# Patient Record
Sex: MALE | Race: WHITE | NOT HISPANIC OR LATINO | Employment: FULL TIME | URBAN - METROPOLITAN AREA
[De-identification: names, ages, dates, MRNs, and addresses within clinical notes are randomized per-mention and may not be internally consistent; named-entity substitution may affect disease eponyms.]

---

## 2017-01-21 ENCOUNTER — TRANSCRIBE ORDERS (OUTPATIENT)
Dept: ADMINISTRATIVE | Facility: HOSPITAL | Age: 32
End: 2017-01-21

## 2017-01-21 ENCOUNTER — APPOINTMENT (OUTPATIENT)
Dept: LAB | Facility: HOSPITAL | Age: 32
End: 2017-01-21
Payer: COMMERCIAL

## 2017-01-21 DIAGNOSIS — Z00.00 ENCOUNTER FOR GENERAL ADULT MEDICAL EXAMINATION WITHOUT ABNORMAL FINDINGS: ICD-10-CM

## 2017-01-21 DIAGNOSIS — L30.9 DERMATITIS: ICD-10-CM

## 2017-01-21 LAB
ALBUMIN SERPL BCP-MCNC: 3.9 G/DL (ref 3.5–5)
ALP SERPL-CCNC: 48 U/L (ref 46–116)
ALT SERPL W P-5'-P-CCNC: 30 U/L (ref 12–78)
ANION GAP SERPL CALCULATED.3IONS-SCNC: 9 MMOL/L (ref 4–13)
AST SERPL W P-5'-P-CCNC: 24 U/L (ref 5–45)
BILIRUB SERPL-MCNC: 0.5 MG/DL (ref 0.2–1)
BUN SERPL-MCNC: 11 MG/DL (ref 5–25)
CALCIUM SERPL-MCNC: 8.6 MG/DL (ref 8.3–10.1)
CHLORIDE SERPL-SCNC: 104 MMOL/L (ref 100–108)
CHOLEST SERPL-MCNC: 135 MG/DL (ref 50–200)
CO2 SERPL-SCNC: 30 MMOL/L (ref 21–32)
CREAT SERPL-MCNC: 0.87 MG/DL (ref 0.6–1.3)
GFR SERPL CREATININE-BSD FRML MDRD: >60 ML/MIN/1.73SQ M
GLUCOSE SERPL-MCNC: 93 MG/DL (ref 65–140)
HDLC SERPL-MCNC: 50 MG/DL (ref 40–60)
LDLC SERPL CALC-MCNC: 71 MG/DL (ref 0–100)
POTASSIUM SERPL-SCNC: 3.9 MMOL/L (ref 3.5–5.3)
PROT SERPL-MCNC: 7.4 G/DL (ref 6.4–8.2)
SODIUM SERPL-SCNC: 143 MMOL/L (ref 136–145)
TRIGL SERPL-MCNC: 69 MG/DL

## 2017-01-21 PROCEDURE — 85732 THROMBOPLASTIN TIME PARTIAL: CPT

## 2017-01-21 PROCEDURE — 85670 THROMBIN TIME PLASMA: CPT

## 2017-01-21 PROCEDURE — 85613 RUSSELL VIPER VENOM DILUTED: CPT

## 2017-01-21 PROCEDURE — 36415 COLL VENOUS BLD VENIPUNCTURE: CPT

## 2017-01-21 PROCEDURE — 85705 THROMBOPLASTIN INHIBITION: CPT

## 2017-01-21 PROCEDURE — 86038 ANTINUCLEAR ANTIBODIES: CPT

## 2017-01-21 PROCEDURE — 80061 LIPID PANEL: CPT

## 2017-01-21 PROCEDURE — 80053 COMPREHEN METABOLIC PANEL: CPT

## 2017-01-23 ENCOUNTER — GENERIC CONVERSION - ENCOUNTER (OUTPATIENT)
Dept: OTHER | Facility: OTHER | Age: 32
End: 2017-01-23

## 2017-01-23 LAB — RYE IGE QN: NEGATIVE

## 2017-01-24 ENCOUNTER — GENERIC CONVERSION - ENCOUNTER (OUTPATIENT)
Dept: OTHER | Facility: OTHER | Age: 32
End: 2017-01-24

## 2017-01-24 LAB
APTT SCREEN TO CONFIRM RATIO: 0.78 RATIO (ref 0–1.4)
CONFIRM APTT/NORMAL: 41 SEC (ref 0–55)
LA PPP-IMP: NORMAL
SCREEN APTT: 39.8 SEC (ref 0–40.6)
SCREEN DRVVT: 34.9 SEC (ref 0–44)
THROMBIN TIME: 17.2 SEC (ref 0–20.9)

## 2017-02-13 ENCOUNTER — ALLSCRIPTS OFFICE VISIT (OUTPATIENT)
Dept: OTHER | Facility: OTHER | Age: 32
End: 2017-02-13

## 2017-05-26 ENCOUNTER — TRANSCRIBE ORDERS (OUTPATIENT)
Dept: ADMINISTRATIVE | Facility: HOSPITAL | Age: 32
End: 2017-05-26

## 2017-05-31 ENCOUNTER — TRANSCRIBE ORDERS (OUTPATIENT)
Dept: ADMINISTRATIVE | Facility: HOSPITAL | Age: 32
End: 2017-05-31

## 2017-06-02 ENCOUNTER — TRANSCRIBE ORDERS (OUTPATIENT)
Dept: ADMINISTRATIVE | Facility: HOSPITAL | Age: 32
End: 2017-06-02

## 2017-06-02 DIAGNOSIS — H90.11 CONDUCTIVE HEARING LOSS OF RIGHT EAR, UNSPECIFIED HEARING STATUS ON CONTRALATERAL SIDE: Primary | ICD-10-CM

## 2017-06-08 ENCOUNTER — HOSPITAL ENCOUNTER (OUTPATIENT)
Dept: CT IMAGING | Facility: HOSPITAL | Age: 32
Discharge: HOME | End: 2017-06-08
Payer: COMMERCIAL

## 2017-06-08 DIAGNOSIS — H90.11 CONDUCTIVE HEARING LOSS OF RIGHT EAR, UNSPECIFIED HEARING STATUS ON CONTRALATERAL SIDE: ICD-10-CM

## 2017-06-08 PROCEDURE — 70480 CT ORBIT/EAR/FOSSA W/O DYE: CPT

## 2017-07-29 ENCOUNTER — TRANSCRIBE ORDERS (OUTPATIENT)
Dept: ADMINISTRATIVE | Facility: HOSPITAL | Age: 32
End: 2017-07-29

## 2017-07-29 ENCOUNTER — APPOINTMENT (OUTPATIENT)
Dept: LAB | Facility: HOSPITAL | Age: 32
End: 2017-07-29
Payer: COMMERCIAL

## 2017-07-29 DIAGNOSIS — Z00.8 HEALTH EXAMINATION IN POPULATION SURVEY: Primary | ICD-10-CM

## 2017-07-29 DIAGNOSIS — Z00.8 HEALTH EXAMINATION IN POPULATION SURVEY: ICD-10-CM

## 2017-07-29 LAB
CHOLEST SERPL-MCNC: 140 MG/DL (ref 50–200)
EST. AVERAGE GLUCOSE BLD GHB EST-MCNC: 120 MG/DL
HBA1C MFR BLD: 5.8 % (ref 4.2–6.3)
HDLC SERPL-MCNC: 48 MG/DL (ref 40–60)
LDLC SERPL CALC-MCNC: 80 MG/DL (ref 0–100)
TRIGL SERPL-MCNC: 58 MG/DL

## 2017-07-29 PROCEDURE — 80061 LIPID PANEL: CPT

## 2017-07-29 PROCEDURE — 36415 COLL VENOUS BLD VENIPUNCTURE: CPT

## 2017-07-29 PROCEDURE — 83036 HEMOGLOBIN GLYCOSYLATED A1C: CPT

## 2017-08-15 ENCOUNTER — ALLSCRIPTS OFFICE VISIT (OUTPATIENT)
Dept: OTHER | Facility: OTHER | Age: 32
End: 2017-08-15

## 2017-08-15 DIAGNOSIS — H91.91 HEARING LOSS OF RIGHT EAR: ICD-10-CM

## 2018-01-10 NOTE — RESULT NOTES
Verified Results  (1) LUPUS ANTICOAGULANT PROFILE 21Jan2017 07:39AM Mary Jo Mcclure Order Number: [id number]     Test Name Result Flag Reference   PTT LUPUS ANTICOAGULANT 39 8 sec  0 0 - 40 6   **Effective February 20, 2017 PTT-LA reference**    interval will be changing to: 0 0 - 43 6   DILUTE LUDIN VIPER VENOM TIME 34 9 sec  0 0 - 44 0   DILUTE PROTHROMBIN TIME(DPT) 41 0 sec  0 0 - 55 0   THROMBIN TIME (DRVW) 17 2 sec  0 0 - 20 9   DPT CONFIRM RATIO 0 78 Ratio  0 00 - 1 40   LUPUS REFLEX INTERPRETATION Comment:     No lupus anticoagulant was detected      Performed at:  [address]  [id number]  : Yaw Brandt MD, Phone:  [phone]       Discussion/Summary   inform labs wnl

## 2018-01-12 ENCOUNTER — ALLSCRIPTS OFFICE VISIT (OUTPATIENT)
Dept: OTHER | Facility: OTHER | Age: 33
End: 2018-01-12

## 2018-01-12 VITALS
SYSTOLIC BLOOD PRESSURE: 115 MMHG | OXYGEN SATURATION: 98 % | HEIGHT: 69 IN | RESPIRATION RATE: 16 BRPM | DIASTOLIC BLOOD PRESSURE: 82 MMHG | BODY MASS INDEX: 26.81 KG/M2 | HEART RATE: 58 BPM | WEIGHT: 181 LBS

## 2018-01-13 NOTE — PROGRESS NOTES
Assessment   1  Acute maxillary sinusitis, recurrence not specified (461 0) (J01 00)    Plan   Acute maxillary sinusitis, recurrence not specified    · Amoxicillin-Pot Clavulanate 875-125 MG Oral Tablet; TAKE 1 TABLET EVERY 12    HOURS DAILY    Discussion/Summary      Patient is to start antibiotics  He is to use saline nasal spray and a Mucinex type product to help loosen the mucus  He is to follow up if he is not improving  The patient was counseled regarding instructions for management,-- risk factor reductions,-- impressions,-- risks and benefits of treatment options,-- importance of compliance with treatment  Possible side effects of new medications were reviewed with the patient/guardian today  The treatment plan was reviewed with the patient/guardian  The patient/guardian understands and agrees with the treatment plan       Self Referrals: No      Chief Complaint   Patient is here for evaluation of possible sinus infection  Symptoms are: headache, pain and pressure around eyes  Symptoms started one weeks ago  Patient tried OTC but no real improvement  History of Present Illness   HPI: 60-year-old male who comes in today complaining of facial pain and headaches  He became congested had a sore throat and had a cough that started last week  The congestion has continued as well as the cough  He is now noticing pain over his left maxillary area  Sinusitis (Brief): The patient is being seen for an initial evaluation of sinusitis  The sinusitis involves the maxillary sinuses  The sinusitis is classified as acute  facial pain facial pressure headache no retro-orbital pain no dental pain nasal congestion no clear rhinorrhea purulent rhinorrhea postnasal drainage      Associated symptoms:  sore throat-- and-- cough, but-- no fever,-- no chills,-- no nausea,-- no ear fullness,-- no ear pain,-- no ear pressure,-- no diplopia,-- no periorbital redness,-- no periorbital swelling-- and-- no neck stiffness  Current treatment includes non-prescription analgesics  By report, there is good compliance with treatment, good tolerance of treatment and poor symptom control  Review of Systems        Constitutional: as noted in HPI       ENT: as noted in HPI  Respiratory: as noted in HPI  ROS reviewed  Active Problems   1  Hearing loss on right (389 9) (H91 91)   2  Rosacea, acne (695 3) (L71 9)    Past Medical History   1  History of dermatitis (V13 3) (Z87 2)  Active Problems And Past Medical History Reviewed: The active problems and past medical history were reviewed and updated today  Family History   Mother    1  Denied: Family history of mental disorder   2  Denied: Family history of substance abuse  Father    3  Denied: Family history of mental disorder   4  Denied: Family history of substance abuse  Family History Reviewed: The family history was reviewed and updated today  Social History    · Always uses seat belt   · Daily caffeinated coffee consumption   · Employed   · Exercises regularly   · Lives with spouse   ·    · Never a smoker   · Seeing a dentist   · Social alcohol use (Z78 9)  The social history was reviewed and is unchanged  Surgical History   1  History of Cholesteatoma Surgery  Surgical History Reviewed: The surgical history was reviewed and updated today  Current Meds      The medication list was reviewed and updated today  Allergies   1  No Known Drug Allergies    Vitals    Recorded: 12Jan2018 03:49PM   Temperature 98 3 F   Heart Rate 62   Systolic 915   Diastolic 92   Height 5 ft 8 5 in   Weight 169 lb 9 oz   BMI Calculated 25 41   BSA Calculated 1 92   O2 Saturation 98     Physical Exam        Constitutional      General appearance: No acute distress, well appearing and well nourished  Ears, Nose, Mouth, and Throat      External inspection of ears and nose: Abnormal  -- Maxillary sinuses tender        Otoscopic examination: Tympanic membrance translucent with normal light reflex  Canals patent without erythema  Nasal mucosa, septum, and turbinates: Abnormal  -- Mucosa is edematous and erythematous  Oropharynx: Abnormal  -- Thick postnasal drainage noted  Pulmonary      Respiratory effort: No increased work of breathing or signs of respiratory distress  Auscultation of lungs: Clear to auscultation, equal breath sounds bilaterally, no wheezes, no rales, no rhonci  Cardiovascular      Auscultation of heart: Normal rate and rhythm, normal S1 and S2, without murmurs            Signatures    Electronically signed by : BERT Mckay; Jan 12 2018  4:15PM EST                       (Author)     Electronically signed by : MAGGY Andrew ; Jan 12 2018  4:44PM EST

## 2018-01-13 NOTE — RESULT NOTES
Verified Results  (1) SHARONDA SCREEN W/REFLEX TO TITER/PATTERN [id number] 07:39AM Korey Sage Order Number: [id number]     Test Name Result Flag Reference   SHARONDA SCREEN   Negative  Negative       Discussion/Summary   inform neg , wnl

## 2018-01-15 VITALS
HEIGHT: 69 IN | HEART RATE: 52 BPM | OXYGEN SATURATION: 98 % | WEIGHT: 172.5 LBS | BODY MASS INDEX: 25.55 KG/M2 | DIASTOLIC BLOOD PRESSURE: 80 MMHG | SYSTOLIC BLOOD PRESSURE: 112 MMHG

## 2018-01-18 NOTE — RESULT NOTES
Verified Results  (1) LIPID PANEL, FASTING 21Jan2017 07:39AM Mary Jo Mcclure Order Number: [id number]     Test Name Result Flag Reference   CHOLESTEROL 135 mg/dL     HDL,DIRECT 50 mg/dL  40-60   Specimen collection should occur prior to Metamizole administration due to the potential for falsely depressed results  LDL CHOLESTEROL CALCULATED 71 mg/dL  0-100   - Patient Instructions: This is a fasting blood test  Water,black tea or black  coffee only after 9:00pm the night before test   Drink 2 glasses of water the morning of test     - Patient Instructions: This is a fasting blood test  Water,black tea or black  coffee only after 9:00pm the night before test Drink 2 glasses of water the morning of test   Triglyceride:         Normal              <150 mg/dl       Borderline High    150-199 mg/dl       High               200-499 mg/dl       Very High          >499 mg/dl  Cholesterol:         Desirable        <200 mg/dl      Borderline High  200-239 mg/dl      High             >239 mg/dl  HDL Cholesterol:        High    >59 mg/dL      Low     <41 mg/dL  LDL CALCULATED:    This screening LDL is a calculated result  It does not have the accuracy of the Direct Measured LDL in the monitoring of patients with hyperlipidemia and/or statin therapy  Direct Measure LDL (XSI027) must be ordered separately in these patients  TRIGLYCERIDES 69 mg/dL  <=150   Specimen collection should occur prior to N-Acetylcysteine or Metamizole administration due to the potential for falsely depressed results         Discussion/Summary   infrom labs wnl    chol great     glucose normal

## 2018-01-23 VITALS
HEIGHT: 69 IN | DIASTOLIC BLOOD PRESSURE: 92 MMHG | BODY MASS INDEX: 25.11 KG/M2 | SYSTOLIC BLOOD PRESSURE: 144 MMHG | WEIGHT: 169.56 LBS | TEMPERATURE: 98.3 F | HEART RATE: 62 BPM | OXYGEN SATURATION: 98 %

## 2018-04-10 ENCOUNTER — OFFICE VISIT (OUTPATIENT)
Dept: UROLOGY | Facility: CLINIC | Age: 33
End: 2018-04-10
Payer: COMMERCIAL

## 2018-04-10 VITALS
SYSTOLIC BLOOD PRESSURE: 136 MMHG | HEIGHT: 69 IN | HEART RATE: 74 BPM | DIASTOLIC BLOOD PRESSURE: 80 MMHG | WEIGHT: 170 LBS | BODY MASS INDEX: 25.18 KG/M2

## 2018-04-10 DIAGNOSIS — Z30.2 ENCOUNTER FOR STERILIZATION: Primary | ICD-10-CM

## 2018-04-10 PROCEDURE — 99244 OFF/OP CNSLTJ NEW/EST MOD 40: CPT | Performed by: UROLOGY

## 2018-04-10 RX ORDER — DIAZEPAM 10 MG/1
10 TABLET ORAL ONCE
Qty: 1 TABLET | Refills: 0 | Status: SHIPPED | OUTPATIENT
Start: 2018-04-10 | End: 2018-08-08 | Stop reason: CLARIF

## 2018-04-10 NOTE — PATIENT INSTRUCTIONS
Vasectomy   WHAT YOU NEED TO KNOW:   A vasectomy is a procedure to make you sterile  It is a permanent form of birth control  The vas deferens (sperm tubes) are cut so that the semen does not contain sperm  HOW TO PREPARE:   Before your procedure:   · Write down the correct date, time, and location of your procedure  · Arrange a ride home  Ask a family member or friend to drive you home after your surgery or procedure  Do not drive yourself home  · Ask your caregiver if you need to stop using aspirin or any other prescribed or over-the-counter medicine before your procedure or surgery  · Bring your medicine bottles or a list of your medicines when you see your caregiver  Tell your caregiver if you are allergic to any medicine  Tell your caregiver if you use any herbs, food supplements, or over-the-counter medicine  The night before your procedure:  Ask caregivers about directions for eating and drinking  The day of your procedure:   · Ask your caregiver before taking any medicine on the day of your procedure  These medicines include insulin, diabetic pills, high blood pressure pills, or heart pills  Bring a list of all the medicines you take, or your pill bottles, with you to the hospital     · You or a close family member will be asked to sign a legal document called a consent form  It gives caregivers permission to do the procedure or surgery  It also explains the problems that may happen, and your choices  Make sure all your questions are answered before you sign this form  · Caregivers may insert an intravenous tube (IV) into your vein  A vein in the arm is usually chosen  Through the IV tube, you may be given liquids and medicine  · An anesthesiologist will talk to you before your surgery  You may need medicine to keep you asleep or numb an area of your body during surgery   Tell caregivers if you or anyone in your family has had a problem with anesthesia in the past     · Bring an athletic supporter to wear after your procedure  WHAT WILL HAPPEN:   What will happen: An incision will be made on one side of your scrotum or down the middle  One of your sperm tubes will be pulled through the incision  Your surgeon will cut the sperm tube and remove a small portion of it  He may then close one or both ends with stitches or a heat treatment  He also may sew a piece of body tissue between the cut ends of your tubes  Your surgeon will then do the same procedure to your other sperm tube  Your incisions may be closed with stitches, tissue glue, or left open to heal  Germ-fighting medicine may be put on your scrotum, and the area will be covered with a bandage  After your procedure: You will be taken to a room to rest until you are fully awake  Healthcare providers will monitor you closely for any problems  Healthcare providers may apply ice and an athletic supporter to decrease swelling and bruising  Do not get out of bed until your healthcare provider says it is okay  When your healthcare provider sees that you are okay, you will be able to go home or be taken to your hospital room  CONTACT YOUR HEALTHCARE PROVIDER IF:   · You cannot make it to your procedure  · You have a fever  · You get a cold or the flu  · You have questions or concerns about your procedure  RISKS:   You may bleed more than expected  Your scrotum may be bruised or inflamed  You may get a wound, urinary tract, or epididymal infection  The epididymis is a long, curled tube on the back of your scrotum  You may feel pain when you have an erection  Granulomas may form if sperm leaks out of your cut sperm tube  Granulomas are a lump that forms under your skin  You may not become sterile if one or both of your cut sperm tubes grow back together  CARE AGREEMENT:   You have the right to help plan your care  Learn about your health condition and how it may be treated   Discuss treatment options with your caregivers to decide what care you want to receive  You always have the right to refuse treatment  © 2017 [employer] Information is for End User's use only and may not be sold, redistributed or otherwise used for commercial purposes  All illustrations and images included in CareNotes® are the copyrighted property of [employer]  or [employer]  The above information is an  only  It is not intended as medical advice for individual conditions or treatments  Talk to your doctor, nurse or pharmacist before following any medical regimen to see if it is safe and effective for you

## 2018-04-10 NOTE — PROGRESS NOTES
Diagnoses and all orders for this visit:    Encounter for sterilization  -     diazepam (VALIUM) 10 mg tablet; Take 1 tablet (10 mg total) by mouth once for 1 dose Take 1 hour prior to scheduled procedure  -     Vasectomy; Future            Assessment and plan:       We had a long discussion regarding the options for birth control  I told the patient that vasectomy is considered to be a permanent surgical sterilization procedure  We spoke about other options including the possibility of vasectomy reversal at a later time  He understands that vasectomy confers no immunity to STDs  I also told him that according to our present knowledge, there is no causal relationship between vasectomy and subsequent development of prostate cancer  We spoke about the potential complications  The most common one in the short term is scrotal hematoma and infection, which rarely requires re-operation  Additionally, he can react to the anesthetic, develop scrotal swelling, have pain or skin bruising  We spoke about post procedure care to try to minimize this complication  I also asked him to refrain from aspirin products and alcohol prior to the procedure  The long-term complications include but are not limited to vasectomy failure by recanalization, chronic epididymal discomfort, pain, and testicular atrophy, among other possibilities  I described to him how this procedure is normally performed in an office setting and he understands that if anesthesia is desired, this can be performed for him in an outpatient operative setting  Finally, he understands that following vasectomy, hell need to use other means of birth control until hes had two sequential semen analyses that demonstrate the absence of sperm  He understands it will be his responsibility to submit these semen specimens and call our office for the results   I told him again that recanalization is a small but real possibility, and if he is ever concerned about it he can submit another semen specimen for analysis  After discussing the risks, benefits, possible complications and alternatives, informed consents were obtained  Diazepam and Percocet and antibiotics were prescribed  He will return in the near future for the procedure  Chief Complaint     Desire for vasectomy      History of Present Illness     Efrain Lora is a 28 y o  male referred for evaluation of vasectomy  He has 2 biological children  He states that he and his partner have come to the mutual decision they do not desire any additional children  He has no prior past medical, past surgical, or past urologic history    Detailed Urologic History     - please refer to HPI    Review of Systems     Review of Systems   Constitutional: Negative for activity change and fatigue  HENT: Negative for congestion  Eyes: Negative for visual disturbance  Respiratory: Negative for shortness of breath and wheezing  Cardiovascular: Negative for chest pain and leg swelling  Gastrointestinal: Negative for abdominal pain  Endocrine: Negative for polyuria  Genitourinary: Negative for dysuria, flank pain, hematuria and urgency  Musculoskeletal: Negative for back pain  Allergic/Immunologic: Negative for immunocompromised state  Neurological: Negative for dizziness and numbness  Psychiatric/Behavioral: Negative for dysphoric mood  All other systems reviewed and are negative  Allergies     No Known Allergies    Physical Exam     Physical Exam   Constitutional: He is oriented to person, place, and time  He appears well-developed and well-nourished  No distress  HENT:   Head: Normocephalic and atraumatic  Eyes: EOM are normal    Neck: Normal range of motion  Cardiovascular:   Negative lower extremity edema   Pulmonary/Chest: Effort normal and breath sounds normal    Abdominal: Soft  Genitourinary:   Genitourinary Comments: Vas deferens are palpable bilaterally  Musculoskeletal: Normal range of motion  Neurological: He is alert and oriented to person, place, and time  Skin: Skin is warm  Psychiatric: He has a normal mood and affect  His behavior is normal          Vital Signs  Vitals:    04/10/18 0757   BP: 136/80   BP Location: Left arm   Patient Position: Sitting   Cuff Size: Adult   Pulse: 74   Weight: 77 1 kg (170 lb)   Height: 5' 9" (1 753 m)         Current Medications       Current Outpatient Prescriptions:     diazepam (VALIUM) 10 mg tablet, Take 1 tablet (10 mg total) by mouth once for 1 dose Take 1 hour prior to scheduled procedure, Disp: 1 tablet, Rfl: 0      Active Problems     Patient Active Problem List   Diagnosis    Encounter for sterilization         Past Medical History     History reviewed  No pertinent past medical history  Surgical History     History reviewed  No pertinent surgical history  Family History     History reviewed  No pertinent family history  Social History     Social History     History   Smoking Status    Never Smoker   Smokeless Tobacco    Never Used       Portions of the record may have been created with voice recognition software   Occasional wrong word or "sound a like" substitutions may have occurred due to the inherent limitations of voice recognition software   Read the chart carefully and recognize, using context, where substitutions have occurred

## 2018-07-19 ENCOUNTER — PROCEDURE VISIT (OUTPATIENT)
Dept: UROLOGY | Facility: CLINIC | Age: 33
End: 2018-07-19
Payer: COMMERCIAL

## 2018-07-19 VITALS
BODY MASS INDEX: 24.88 KG/M2 | WEIGHT: 168 LBS | SYSTOLIC BLOOD PRESSURE: 128 MMHG | DIASTOLIC BLOOD PRESSURE: 70 MMHG | HEIGHT: 69 IN

## 2018-07-19 DIAGNOSIS — Z30.2 ENCOUNTER FOR VASECTOMY: Primary | ICD-10-CM

## 2018-07-19 PROCEDURE — 55250 REMOVAL OF SPERM DUCT(S): CPT | Performed by: UROLOGY

## 2018-07-19 PROCEDURE — 88302 TISSUE EXAM BY PATHOLOGIST: CPT | Performed by: PATHOLOGY

## 2018-07-25 ENCOUNTER — TELEPHONE (OUTPATIENT)
Dept: UROLOGY | Facility: CLINIC | Age: 33
End: 2018-07-25

## 2018-07-25 ENCOUNTER — OFFICE VISIT (OUTPATIENT)
Dept: UROLOGY | Facility: CLINIC | Age: 33
End: 2018-07-25
Payer: COMMERCIAL

## 2018-07-25 VITALS
TEMPERATURE: 99.5 F | SYSTOLIC BLOOD PRESSURE: 128 MMHG | WEIGHT: 168 LBS | BODY MASS INDEX: 24.88 KG/M2 | DIASTOLIC BLOOD PRESSURE: 80 MMHG | HEART RATE: 74 BPM | HEIGHT: 69 IN

## 2018-07-25 DIAGNOSIS — Z98.52 STATUS POST VASECTOMY: Primary | ICD-10-CM

## 2018-07-25 DIAGNOSIS — Z30.2 ENCOUNTER FOR VASECTOMY: ICD-10-CM

## 2018-07-25 LAB
SL AMB  POCT GLUCOSE, UA: NORMAL
SL AMB LEUKOCYTE ESTERASE,UA: NORMAL
SL AMB POCT BILIRUBIN,UA: NORMAL
SL AMB POCT BLOOD,UA: NORMAL
SL AMB POCT CLARITY,UA: CLEAR
SL AMB POCT COLOR,UA: YELLOW
SL AMB POCT KETONES,UA: NORMAL
SL AMB POCT NITRITE,UA: NORMAL
SL AMB POCT PH,UA: 5
SL AMB POCT SPECIFIC GRAVITY,UA: 1.01
SL AMB POCT URINE PROTEIN: NORMAL
SL AMB POCT UROBILINOGEN: NORMAL

## 2018-07-25 PROCEDURE — 99024 POSTOP FOLLOW-UP VISIT: CPT | Performed by: PHYSICIAN ASSISTANT

## 2018-07-25 PROCEDURE — 81002 URINALYSIS NONAUTO W/O SCOPE: CPT | Performed by: PHYSICIAN ASSISTANT

## 2018-07-25 RX ORDER — CEPHALEXIN 500 MG/1
500 CAPSULE ORAL 3 TIMES DAILY
Qty: 12 CAPSULE | Refills: 0 | Status: SHIPPED | OUTPATIENT
Start: 2018-07-25 | End: 2018-07-29

## 2018-07-25 NOTE — TELEPHONE ENCOUNTER
La patient, managed by Dr Jeanine Miller, s/p vasectomy last Thursday 7/19/18  Received voicemail from spouse, patient started to have fever last night 102 degrees,  Temperature today is 99 and he is using Ibuprofen to treat the fever  He has no redness, no swelling, no discharged at incision sites  He has mild testicular discomfort which has been  since procedure, no change or worsening  States patient may have a virus  Calling to update  Attempted to reach both patient and spouse and got voicemail  Left messages on both patient cell phone and spouse cell phone to call office

## 2018-07-25 NOTE — TELEPHONE ENCOUNTER
Spoke to Dr Lisa Simon, recommend patient be seen for same day wound check today with Cathy in [place]  I called and spoke to spouse offered to schedule at 11:30 am with Bogdan Ahumada in [place], patient went to work  She will check if he can get there for that time and call me back

## 2018-07-25 NOTE — TELEPHONE ENCOUNTER
Spoke to spouse, patient is unable to get to office for 11:30 ,  Per Mukesh mao to schedule today at 2:45 pm in [facility] for wound check  Called and spoke to spouse and patient added to schedule

## 2018-07-26 NOTE — PROGRESS NOTES
7/25/2018  Cooper Luu  1985  [id number]      Assessment/Plan  S/p vasectomy 7/19/18    1  Status post vasectomy  POCT urine dip    Semen analysis, post-vasectomy    Semen analysis, post-vasectomy    cephalexin (KEFLEX) 500 mg capsule   2  Encounter for vasectomy           Discussion  Cooper Luu is a 28 y o  male being managed by Dr Benigno Chambers  The patient is doing well with no complaints  He was provided verbal and written instructions regarding semen analysis testing  He was instructed to use contraception until sterility confirmed with 2 semen analysis  He will call to review results  He will follow up on an as needed basis  All questions were answered  Doubtful that his temperature is associated with the healing process from his vasectomy, in likely secondary to viral old he is experiencing  Very very small drainage from left scrotal incision  We will be cautious and cover with a 5 day course of Keflex  Patient will follow up with primary care provider should his congestion and sore throat worsen  History of Present Illness  28 y o  male s/p vasectomy 7/19/18 with Dr Benigno Chambers, presents today for follow up  Patient admits to dull bilateral testicle discomfort which is improving a daily basis  He denies any residual ecchymosis, edema, or suprapubic pressure  Patient does state that his son was recently sick and patient has been has been experiencing high temperatures up to 101-102  Patient does feel that this is viral in etiology as he has nasal congestion as well as a sore throat  He denies any productive cough  His temperatures have been under 100 over the past 24 hr   He denies any dysuria, gross hematuria, or drainage from incisions      Vitals  Vitals:    07/25/18 1445   BP: 128/80   BP Location: Left arm   Patient Position: Sitting   Cuff Size: Adult   Pulse: 74   Temp: 99 5 °F (37 5 °C)   Weight: 76 2 kg (168 lb)   Height: 5' 9" (1 753 m)       Current Medications  Current Outpatient Prescriptions   Medication Sig Dispense Refill    cephalexin (KEFLEX) 500 mg capsule Take 1 capsule (500 mg total) by mouth 3 (three) times a day for 4 days 12 capsule 0    diazepam (VALIUM) 10 mg tablet Take 1 tablet (10 mg total) by mouth once for 1 dose Take 1 hour prior to scheduled procedure 1 tablet 0     No current facility-administered medications for this visit  Physical Exam  Gu: scrotum midline/bilateral incisions clean and intact  Very small serous drainage from left incision  No fluctuance, crepitus, ecchymosis, or edema

## 2018-07-28 ENCOUNTER — APPOINTMENT (OUTPATIENT)
Dept: LAB | Facility: HOSPITAL | Age: 33
End: 2018-07-28

## 2018-07-28 ENCOUNTER — TRANSCRIBE ORDERS (OUTPATIENT)
Dept: ADMINISTRATIVE | Facility: HOSPITAL | Age: 33
End: 2018-07-28

## 2018-07-28 DIAGNOSIS — Z00.8 ENCOUNTER FOR OTHER GENERAL EXAMINATION: Primary | ICD-10-CM

## 2018-07-28 DIAGNOSIS — Z00.8 ENCOUNTER FOR OTHER GENERAL EXAMINATION: ICD-10-CM

## 2018-07-28 LAB
CHOLEST SERPL-MCNC: 113 MG/DL (ref 50–200)
EST. AVERAGE GLUCOSE BLD GHB EST-MCNC: 117 MG/DL
HBA1C MFR BLD: 5.7 % (ref 4.2–6.3)
HDLC SERPL-MCNC: 38 MG/DL (ref 40–60)
LDLC SERPL CALC-MCNC: 61 MG/DL (ref 0–100)
NONHDLC SERPL-MCNC: 75 MG/DL
TRIGL SERPL-MCNC: 70 MG/DL

## 2018-07-28 PROCEDURE — 80061 LIPID PANEL: CPT

## 2018-07-28 PROCEDURE — 83036 HEMOGLOBIN GLYCOSYLATED A1C: CPT

## 2018-07-28 PROCEDURE — 36415 COLL VENOUS BLD VENIPUNCTURE: CPT

## 2018-08-08 ENCOUNTER — OFFICE VISIT (OUTPATIENT)
Dept: INTERNAL MEDICINE CLINIC | Facility: CLINIC | Age: 33
End: 2018-08-08
Payer: COMMERCIAL

## 2018-08-08 ENCOUNTER — APPOINTMENT (OUTPATIENT)
Dept: LAB | Facility: CLINIC | Age: 33
End: 2018-08-08
Payer: COMMERCIAL

## 2018-08-08 ENCOUNTER — TELEPHONE (OUTPATIENT)
Dept: UROLOGY | Facility: CLINIC | Age: 33
End: 2018-08-08

## 2018-08-08 VITALS
HEIGHT: 69 IN | HEART RATE: 69 BPM | SYSTOLIC BLOOD PRESSURE: 122 MMHG | DIASTOLIC BLOOD PRESSURE: 80 MMHG | OXYGEN SATURATION: 98 % | TEMPERATURE: 99.3 F | WEIGHT: 167 LBS | BODY MASS INDEX: 24.73 KG/M2

## 2018-08-08 DIAGNOSIS — R10.11 RIGHT UPPER QUADRANT ABDOMINAL PAIN: ICD-10-CM

## 2018-08-08 DIAGNOSIS — R50.9 FEVER OF UNKNOWN ORIGIN (FUO): ICD-10-CM

## 2018-08-08 DIAGNOSIS — Z98.52 S/P VASECTOMY: ICD-10-CM

## 2018-08-08 DIAGNOSIS — R50.9 FEVER OF UNKNOWN ORIGIN (FUO): Primary | ICD-10-CM

## 2018-08-08 PROBLEM — L71.9 ROSACEA, ACNE: Status: ACTIVE | Noted: 2017-02-13

## 2018-08-08 LAB
ALBUMIN SERPL BCP-MCNC: 4.1 G/DL (ref 3.5–5)
ALP SERPL-CCNC: 52 U/L (ref 46–116)
ALT SERPL W P-5'-P-CCNC: 34 U/L (ref 12–78)
ANION GAP SERPL CALCULATED.3IONS-SCNC: 6 MMOL/L (ref 4–13)
AST SERPL W P-5'-P-CCNC: 24 U/L (ref 5–45)
BASOPHILS # BLD AUTO: 0.05 THOUSANDS/ΜL (ref 0–0.1)
BASOPHILS NFR BLD AUTO: 0 % (ref 0–1)
BILIRUB SERPL-MCNC: 0.54 MG/DL (ref 0.2–1)
BILIRUB UR QL STRIP: NEGATIVE
BUN SERPL-MCNC: 13 MG/DL (ref 5–25)
CALCIUM SERPL-MCNC: 9.1 MG/DL (ref 8.3–10.1)
CHLORIDE SERPL-SCNC: 101 MMOL/L (ref 100–108)
CLARITY UR: CLEAR
CO2 SERPL-SCNC: 32 MMOL/L (ref 21–32)
COLOR UR: NORMAL
CREAT SERPL-MCNC: 0.98 MG/DL (ref 0.6–1.3)
EOSINOPHIL # BLD AUTO: 0.02 THOUSAND/ΜL (ref 0–0.61)
EOSINOPHIL NFR BLD AUTO: 0 % (ref 0–6)
ERYTHROCYTE [DISTWIDTH] IN BLOOD BY AUTOMATED COUNT: 12.1 % (ref 11.6–15.1)
GFR SERPL CREATININE-BSD FRML MDRD: 102 ML/MIN/1.73SQ M
GLUCOSE SERPL-MCNC: 100 MG/DL (ref 65–140)
GLUCOSE UR STRIP-MCNC: NEGATIVE MG/DL
HCT VFR BLD AUTO: 46.6 % (ref 36.5–49.3)
HGB BLD-MCNC: 15.2 G/DL (ref 12–17)
HGB UR QL STRIP.AUTO: NEGATIVE
IMM GRANULOCYTES # BLD AUTO: 0.05 THOUSAND/UL (ref 0–0.2)
IMM GRANULOCYTES NFR BLD AUTO: 0 % (ref 0–2)
KETONES UR STRIP-MCNC: NEGATIVE MG/DL
LEUKOCYTE ESTERASE UR QL STRIP: NEGATIVE
LYMPHOCYTES # BLD AUTO: 1.73 THOUSANDS/ΜL (ref 0.6–4.47)
LYMPHOCYTES NFR BLD AUTO: 13 % (ref 14–44)
MCH RBC QN AUTO: 29.2 PG (ref 26.8–34.3)
MCHC RBC AUTO-ENTMCNC: 32.6 G/DL (ref 31.4–37.4)
MCV RBC AUTO: 90 FL (ref 82–98)
MONOCYTES # BLD AUTO: 1.06 THOUSAND/ΜL (ref 0.17–1.22)
MONOCYTES NFR BLD AUTO: 8 % (ref 4–12)
NEUTROPHILS # BLD AUTO: 10.4 THOUSANDS/ΜL (ref 1.85–7.62)
NEUTS SEG NFR BLD AUTO: 79 % (ref 43–75)
NITRITE UR QL STRIP: NEGATIVE
NRBC BLD AUTO-RTO: 0 /100 WBCS
PH UR STRIP.AUTO: 7 [PH] (ref 4.5–8)
PLATELET # BLD AUTO: 228 THOUSANDS/UL (ref 149–390)
PMV BLD AUTO: 9.7 FL (ref 8.9–12.7)
POTASSIUM SERPL-SCNC: 4.2 MMOL/L (ref 3.5–5.3)
PROT SERPL-MCNC: 8.2 G/DL (ref 6.4–8.2)
PROT UR STRIP-MCNC: NEGATIVE MG/DL
RBC # BLD AUTO: 5.2 MILLION/UL (ref 3.88–5.62)
SODIUM SERPL-SCNC: 139 MMOL/L (ref 136–145)
SP GR UR STRIP.AUTO: 1.02 (ref 1–1.03)
TSH SERPL DL<=0.05 MIU/L-ACNC: 1.98 UIU/ML (ref 0.36–3.74)
URATE SERPL-MCNC: 3.6 MG/DL (ref 4.2–8)
UROBILINOGEN UR QL STRIP.AUTO: 1 E.U./DL
WBC # BLD AUTO: 13.31 THOUSAND/UL (ref 4.31–10.16)

## 2018-08-08 PROCEDURE — 3008F BODY MASS INDEX DOCD: CPT | Performed by: INTERNAL MEDICINE

## 2018-08-08 PROCEDURE — 81003 URINALYSIS AUTO W/O SCOPE: CPT

## 2018-08-08 PROCEDURE — 86803 HEPATITIS C AB TEST: CPT

## 2018-08-08 PROCEDURE — 36415 COLL VENOUS BLD VENIPUNCTURE: CPT

## 2018-08-08 PROCEDURE — 86705 HEP B CORE ANTIBODY IGM: CPT

## 2018-08-08 PROCEDURE — 80053 COMPREHEN METABOLIC PANEL: CPT

## 2018-08-08 PROCEDURE — 1036F TOBACCO NON-USER: CPT | Performed by: INTERNAL MEDICINE

## 2018-08-08 PROCEDURE — 84443 ASSAY THYROID STIM HORMONE: CPT

## 2018-08-08 PROCEDURE — 86753 PROTOZOA ANTIBODY NOS: CPT

## 2018-08-08 PROCEDURE — 84550 ASSAY OF BLOOD/URIC ACID: CPT

## 2018-08-08 PROCEDURE — 86664 EPSTEIN-BARR NUCLEAR ANTIGEN: CPT

## 2018-08-08 PROCEDURE — 99214 OFFICE O/P EST MOD 30 MIN: CPT | Performed by: INTERNAL MEDICINE

## 2018-08-08 PROCEDURE — 87340 HEPATITIS B SURFACE AG IA: CPT

## 2018-08-08 PROCEDURE — 87389 HIV-1 AG W/HIV-1&-2 AB AG IA: CPT

## 2018-08-08 PROCEDURE — 86038 ANTINUCLEAR ANTIBODIES: CPT

## 2018-08-08 PROCEDURE — 85652 RBC SED RATE AUTOMATED: CPT

## 2018-08-08 PROCEDURE — 86618 LYME DISEASE ANTIBODY: CPT

## 2018-08-08 PROCEDURE — 86308 HETEROPHILE ANTIBODY SCREEN: CPT

## 2018-08-08 PROCEDURE — 86665 EPSTEIN-BARR CAPSID VCA: CPT

## 2018-08-08 PROCEDURE — 86704 HEP B CORE ANTIBODY TOTAL: CPT

## 2018-08-08 PROCEDURE — 87040 BLOOD CULTURE FOR BACTERIA: CPT

## 2018-08-08 PROCEDURE — 85025 COMPLETE CBC W/AUTO DIFF WBC: CPT

## 2018-08-08 PROCEDURE — 86666 EHRLICHIA ANTIBODY: CPT

## 2018-08-08 PROCEDURE — 86663 EPSTEIN-BARR ANTIBODY: CPT

## 2018-08-08 NOTE — TELEPHONE ENCOUNTER
I called and talked to the patient on the phone  Patient states his primary symptoms include an oscillating fever which has been controlled with Advil and Tylenol  He also notes non-radiating central and lower abdominal pain  He denies any changes in bowel habits  Denies any dysuria, gross hematuria, nausea, or vomiting  Patient states that he continues to have dull ache bilaterally in his testicles, left greater than right  He denies any new swelling, drainage, ecchymosis, or other changes to his physical examination of his scrotum/testicles  Patient denies any new rashes over his body  I reviewed with the patient that given his symptoms he should be evaluated through his primary care provider for further management of his continued fevers over the past 2 weeks as this does not appear to be urologic in nature  Patient agrees and verbalized understanding

## 2018-08-08 NOTE — PATIENT INSTRUCTIONS
Fever in Adults   AMBULATORY CARE:   A fever  is an increase in your body temperature  Normal body temperature is 98 6°F (37°C)  Fever is generally defined as greater than 100 4°F (38°C)  Common causes include an infection, injury, or disease such as arthritis  Other signs and symptoms may include any of the following:   · Chills and shivers     · Muscle stiffness    · Weight loss    · Night sweats    · Fever that comes and goes    · Fever that is higher in the morning  Seek care immediately if:   · Your fever does not go away or gets worse even after treatment  · You have a stiff neck and a bad headache  · You are confused  You may not be able to think clearly or remember things like you normally do  · Your heart beats faster than usual even after treatment  · You have shortness of breath or chest pain when you breathe  · You urinate small amounts or not at all  · Your skin, lips, or nails turn blue  Contact your healthcare provider if:   · You have abdominal pain or you feel bloated  · You have nausea or are vomiting  · You have pain or burning when you urinate, or you have pain in your back  · You have questions or concerns about your condition or care  Treatment for a fever  may include any of the following:  · NSAIDs , such as ibuprofen, help decrease swelling, pain, and fever  This medicine is available with or without a doctor's order  NSAIDs can cause stomach bleeding or kidney problems in certain people  If you take blood thinner medicine, always ask if NSAIDs are safe for you  Always read the medicine label and follow directions  Do not give these medicines to children under 10months of age without direction from your child's healthcare provider  · Acetaminophen  decreases pain and fever  It is available without a doctor's order  Ask how much to take and how often to take it  Follow directions   Read the labels of all other medicines you are using to see if they also contain acetaminophen, or ask your doctor or pharmacist  Acetaminophen can cause liver damage if not taken correctly  Do not use more than 4 grams (4,000 milligrams) total of acetaminophen in one day  · Antibiotics  may be given if you have an infection caused by bacteria  · Take your medicine as directed  Contact your healthcare provider if you think your medicine is not helping or if you have side effects  Tell him of her if you are allergic to any medicine  Keep a list of the medicines, vitamins, and herbs you take  Include the amounts, and when and why you take them  Bring the list or the pill bottles to follow-up visits  Carry your medicine list with you in case of an emergency  Self-care:   · Drink more liquids as directed  A fever makes you sweat  This can increase your risk for dehydration  Liquids can help prevent dehydration  ¨ Drink at least 6 to 8 eight-ounce cups of clear liquids each day  Drink water, juice, or broth  Do not drink sports drinks  They may contain caffeine  ¨ Ask your healthcare provider if you should drink an oral rehydration solution (ORS)  An ORS has the right amounts of water, salts, and sugar you need to replace body fluids  · Dress in lightweight clothes  Shivers may be a sign that your fever is rising  Do not put extra blankets or clothes on  This may cause your fever to rise even higher  Dress in light, comfortable clothing  Use a lightweight blanket or sheet when you sleep  Change your clothes, blanket, or sheets if they get wet  · Cool yourself safely  Take a bath in cool or lukewarm water  Use an ice pack wrapped in a small towel or wet a washcloth with cool water  Place the ice pack or wet washcloth on your forehead or the back of your neck  Follow up with your healthcare provider as directed:  Write down your questions so you remember to ask them during your visits     © 2017 [employer] Information is for End User's use only and may not be sold, redistributed or otherwise used for commercial purposes  All illustrations and images included in CareNotes® are the copyrighted property of [employer]  or [employer]  The above information is an  only  It is not intended as medical advice for individual conditions or treatments  Talk to your doctor, nurse or pharmacist before following any medical regimen to see if it is safe and effective for you

## 2018-08-08 NOTE — TELEPHONE ENCOUNTER
Abhinav patient, s/p vasectomy 7/19/18  Patient was seen acutely on 7/25/18 for c/o fever and prescribed Keflex at office visit  Returned call from wife, patient completed his oral Keflex 2 days ago  Patient is reporting onset of fever 101 3 and new lower pelvic pain with testicular swelling, onset yesterday afternoon  Patient is taking Advil and temperature is 100 degrees this AM   No other generalized complaints  Advised spouse message will be sent to provider to review and clinical will call back with recommendation  Pharmacy is [facility]

## 2018-08-08 NOTE — PROGRESS NOTES
INTERNAL MEDICINE FOLLOW-UP OFFICE VISIT  [facility] [address] [place] [address]    NAME: Marybeth Castañeda  AGE: 28 y o  SEX: male  : 1985     DATE: 2018     Assessment and Plan:     1  Fever of unknown origin (FUO)    Broad differential and will perform broad work-up  Still could be self limiting viral illness  For now stay well hydrated and take tylenol/ibuprofen prn for fevers  Monitor symptoms  Discussed symptoms that would warrant ER eval  Will call with results of testing     - Ehrlichia antibody panel; Future  - Babesia microti antibody, IgG & Igm; Future  - Lyme Antibody Profile with reflex to WB; Future  - Sedimentation rate, automated; Future  - CBC and differential; Future  - SHARONDA Screen w/ Reflex to Titer/Pattern; Future  - EBV acute panel; Future  - Blood culture; Future  - Comprehensive metabolic panel; Future  - Mononucleosis screen; Future  - US abdomen complete; Future  - Blood culture; Future  - TSH, 3rd generation with T4 reflex; Future  - UA w Reflex to Microscopic w Reflex to Culture -Lab Collect; Future  - Uric acid; Future  - HIV 1/2 AG-AB combo; Future  - Chronic Hepatitis Panel; Future    2  Right upper quadrant abdominal pain    No abdominal symptoms on exam  Check US abdomen complete  - US abdomen complete; Future    3  S/P vasectomy    Healing well  No signs of infection  Chief Complaint:     Chief Complaint   Patient presents with    Establish Care     new pt    Cold Like Symptoms     started; 3-4 weeks ago    Fever     feeling hot and cold    Groin Swelling     coming from vasectomy, sore      History of Present Illness:     Patient presents due to fever of unknown origin  Patient had a vasectomy back on 2018  A few days later he started to experience fever  Patient states he continues to get recurrent fever since that time  Patient has followed up with the urologist and do not feel it is due to any urologic abnormality    Patient was given a script for Keflex to take for 5 days and there was no change in his symptoms  Patient states he had a fever 102 3 last night  Patient took Advil this morning and notes that his temperatures on the higher side but he knows that is not a fever at this point  The patient states he could feel his body getting hotter throughout the day  Patient denies any significant cold symptoms  Patient states initially he thought he had some minor cold symptoms since his son was sick but those symptoms ultimately went away  Patient states the new symptom he has is this abdominal pain that is difficult for him to describe  Patient states the abdominal pain is ultimately mild  Patient denies any change in the abdominal pain with food intake  Patient denies any change in his bowel movements  Patient denies any nausea, vomiting, abdominal distention, jaundice, change in urine color  Patient denies any recent travel  Patient denies any history of rheumatological disease  Patient has no history of rheumatological disease in his family  Patient had an SHARONDA in the past which was negative  Patient denies any shortness of breath, wheezing, hemoptysis  Patient denies any known contact with anyone with tuberculosis  Patient denies any urinary symptoms  Patient denies any rashes or joint swelling  Patient denies any tick bites  Patient still feels that his groin is mildly swollen but ultimately states Urology checked him out and everything turned out okay  Patient denies any purulent drainage  Patient denies any erythema  Patient denies any vision changes, confusion, sinus congestion, earache  The following portions of the patient's history were reviewed and updated as appropriate: allergies, current medications, past family history, past medical history, past social history, past surgical history and problem list      Review of Systems:     Review of Systems   Constitutional: Positive for fever   Negative for activity change, appetite change and fatigue  Respiratory: Negative for apnea, cough, chest tightness, shortness of breath and wheezing  Cardiovascular: Negative for chest pain, palpitations and leg swelling  Gastrointestinal: Positive for abdominal pain  Negative for abdominal distention, blood in stool, constipation, diarrhea, nausea and vomiting  Musculoskeletal: Negative for arthralgias, back pain, gait problem, joint swelling and myalgias  Skin: Negative for rash and wound  Neurological: Negative for dizziness, tremors, seizures, syncope, facial asymmetry, speech difficulty, weakness, light-headedness, numbness and headaches  Psychiatric/Behavioral: Negative for behavioral problems, confusion, hallucinations, sleep disturbance and suicidal ideas  The patient is not nervous/anxious  Problem List:     Patient Active Problem List   Diagnosis    Encounter for sterilization    Encounter for vasectomy    Rosacea, acne      Objective:     /80 (BP Location: Left arm, Patient Position: Sitting, Cuff Size: Standard)   Pulse 69   Temp 99 3 °F (37 4 °C) (Tympanic) Comment: w/ advil this morning  Ht 5' 9" (1 753 m)   Wt 75 8 kg (167 lb)   SpO2 98%   BMI 24 66 kg/m²     Physical Exam   Constitutional: He is oriented to person, place, and time  He appears well-developed and well-nourished  No distress  HENT:   Right Ear: External ear normal    Left Ear: External ear normal    Nose: Nose normal    Mouth/Throat: Oropharynx is clear and moist  No oropharyngeal exudate  Eyes: Conjunctivae are normal  Right eye exhibits no discharge  Left eye exhibits no discharge  No scleral icterus  Neck: Neck supple  No JVD present  No thyromegaly present  Cardiovascular: Normal rate, regular rhythm, normal heart sounds and intact distal pulses  Exam reveals no gallop and no friction rub  No murmur heard  Pulmonary/Chest: Effort normal and breath sounds normal  No respiratory distress   He has no wheezes  He has no rales  He exhibits no tenderness  Abdominal: Soft  Bowel sounds are normal  He exhibits no distension and no mass  There is no tenderness  There is no rebound and no guarding  Genitourinary: Rectum normal and penis normal  No penile tenderness  Musculoskeletal: Normal range of motion  He exhibits no edema  Lymphadenopathy:     He has no cervical adenopathy  Neurological: He is alert and oriented to person, place, and time  Skin: Skin is warm and dry  He is not diaphoretic  Psychiatric: He has a normal mood and affect  His behavior is normal    Vitals reviewed  Pertinent Laboratory/Diagnostic Studies:    Laboratory Results: I have personally reviewed the pertinent laboratory results/reports     PHQ-9  Negative for depression with PHQ2 score of 0  Current Medications:     No current outpatient prescriptions on file  No current facility-administered medications for this visit          Bro Rush DO  [facility] [address] [place] [address]

## 2018-08-09 ENCOUNTER — HOSPITAL ENCOUNTER (OUTPATIENT)
Dept: ULTRASOUND IMAGING | Facility: HOSPITAL | Age: 33
Discharge: HOME | End: 2018-08-09
Payer: COMMERCIAL

## 2018-08-09 DIAGNOSIS — R50.9 FEVER OF UNKNOWN ORIGIN (FUO): ICD-10-CM

## 2018-08-09 DIAGNOSIS — R10.11 RIGHT UPPER QUADRANT ABDOMINAL PAIN: ICD-10-CM

## 2018-08-09 LAB
B BURGDOR IGG SER IA-ACNC: 0.04
B BURGDOR IGM SER IA-ACNC: 0.19
ERYTHROCYTE [SEDIMENTATION RATE] IN BLOOD: 35 MM/HOUR (ref 0–10)
HBV CORE AB SER QL: NORMAL
HBV CORE IGM SER QL: NORMAL
HBV SURFACE AG SER QL: NORMAL
HCV AB SER QL: NORMAL
HETEROPH AB SER QL: NEGATIVE

## 2018-08-09 PROCEDURE — 76700 US EXAM ABDOM COMPLETE: CPT

## 2018-08-10 LAB
EBV EA IGG SER-ACNC: <9 U/ML (ref 0–8.9)
EBV NA IGG SER IA-ACNC: 22.3 U/ML (ref 0–17.9)
EBV PATRN SPEC IB-IMP: ABNORMAL
EBV VCA IGG SER IA-ACNC: 339 U/ML (ref 0–17.9)
EBV VCA IGM SER IA-ACNC: <36 U/ML (ref 0–35.9)
HIV 1+2 AB+HIV1 P24 AG SERPL QL IA: NORMAL
RYE IGE QN: NEGATIVE

## 2018-08-13 LAB
A PHAGOCYTOPH IGG TITR SER IF: NEGATIVE {TITER}
A PHAGOCYTOPH IGM TITR SER IF: NEGATIVE {TITER}
B MICROTI IGG TITR SER: NORMAL {TITER}
B MICROTI IGM TITR SER: NORMAL {TITER}
BACTERIA BLD CULT: NORMAL
BACTERIA BLD CULT: NORMAL
E CHAFFEENSIS IGG TITR SER IF: NEGATIVE {TITER}
E CHAFFEENSIS IGM TITR SER IF: NEGATIVE {TITER}

## 2018-08-14 DIAGNOSIS — D72.829 LEUKOCYTOSIS, UNSPECIFIED TYPE: Primary | ICD-10-CM

## 2018-08-18 ENCOUNTER — TELEPHONE (OUTPATIENT)
Dept: INTERNAL MEDICINE CLINIC | Facility: CLINIC | Age: 33
End: 2018-08-18

## 2018-08-18 ENCOUNTER — APPOINTMENT (OUTPATIENT)
Dept: LAB | Facility: HOSPITAL | Age: 33
End: 2018-08-18
Attending: INTERNAL MEDICINE
Payer: COMMERCIAL

## 2018-08-18 DIAGNOSIS — D72.829 LEUKOCYTOSIS, UNSPECIFIED TYPE: ICD-10-CM

## 2018-08-18 LAB
BASOPHILS # BLD AUTO: 0.06 THOUSANDS/ΜL (ref 0–0.1)
BASOPHILS NFR BLD AUTO: 1 % (ref 0–1)
EOSINOPHIL # BLD AUTO: 0.04 THOUSAND/ΜL (ref 0–0.61)
EOSINOPHIL NFR BLD AUTO: 1 % (ref 0–6)
ERYTHROCYTE [DISTWIDTH] IN BLOOD BY AUTOMATED COUNT: 11.8 % (ref 11.6–15.1)
HCT VFR BLD AUTO: 43.9 % (ref 36.5–49.3)
HGB BLD-MCNC: 14.8 G/DL (ref 12–17)
IMM GRANULOCYTES # BLD AUTO: 0.01 THOUSAND/UL (ref 0–0.2)
IMM GRANULOCYTES NFR BLD AUTO: 0 % (ref 0–2)
LYMPHOCYTES # BLD AUTO: 2.52 THOUSANDS/ΜL (ref 0.6–4.47)
LYMPHOCYTES NFR BLD AUTO: 48 % (ref 14–44)
MCH RBC QN AUTO: 29.1 PG (ref 26.8–34.3)
MCHC RBC AUTO-ENTMCNC: 33.7 G/DL (ref 31.4–37.4)
MCV RBC AUTO: 86 FL (ref 82–98)
MONOCYTES # BLD AUTO: 0.35 THOUSAND/ΜL (ref 0.17–1.22)
MONOCYTES NFR BLD AUTO: 7 % (ref 4–12)
NEUTROPHILS # BLD AUTO: 2.28 THOUSANDS/ΜL (ref 1.85–7.62)
NEUTS SEG NFR BLD AUTO: 43 % (ref 43–75)
NRBC BLD AUTO-RTO: 0 /100 WBCS
PLATELET # BLD AUTO: 262 THOUSANDS/UL (ref 149–390)
PMV BLD AUTO: 8.6 FL (ref 8.9–12.7)
RBC # BLD AUTO: 5.08 MILLION/UL (ref 3.88–5.62)
WBC # BLD AUTO: 5.26 THOUSAND/UL (ref 4.31–10.16)

## 2018-08-18 PROCEDURE — 85025 COMPLETE CBC W/AUTO DIFF WBC: CPT

## 2018-08-18 PROCEDURE — 36415 COLL VENOUS BLD VENIPUNCTURE: CPT

## 2018-08-18 NOTE — TELEPHONE ENCOUNTER
----- Message from Zahra Box DO sent at 8/18/2018  8:27 AM EDT -----  Let patient know that white blood cell count is now normal  It decreased from 13 31 to 5  26

## 2018-12-21 ENCOUNTER — OFFICE VISIT (OUTPATIENT)
Dept: URGENT CARE | Facility: MEDICAL CENTER | Age: 33
End: 2018-12-21
Payer: COMMERCIAL

## 2018-12-21 VITALS
HEIGHT: 69 IN | DIASTOLIC BLOOD PRESSURE: 74 MMHG | OXYGEN SATURATION: 99 % | SYSTOLIC BLOOD PRESSURE: 116 MMHG | WEIGHT: 174.2 LBS | HEART RATE: 57 BPM | TEMPERATURE: 98 F | RESPIRATION RATE: 18 BRPM | BODY MASS INDEX: 25.8 KG/M2

## 2018-12-21 DIAGNOSIS — J02.9 ACUTE VIRAL PHARYNGITIS: ICD-10-CM

## 2018-12-21 DIAGNOSIS — J02.9 ACUTE PHARYNGITIS, UNSPECIFIED ETIOLOGY: Primary | ICD-10-CM

## 2018-12-21 LAB — S PYO AG THROAT QL: NEGATIVE

## 2018-12-21 PROCEDURE — 99213 OFFICE O/P EST LOW 20 MIN: CPT | Performed by: PHYSICIAN ASSISTANT

## 2018-12-21 PROCEDURE — 87070 CULTURE OTHR SPECIMN AEROBIC: CPT | Performed by: PHYSICIAN ASSISTANT

## 2018-12-21 PROCEDURE — 87147 CULTURE TYPE IMMUNOLOGIC: CPT | Performed by: PHYSICIAN ASSISTANT

## 2018-12-21 PROCEDURE — S9088 SERVICES PROVIDED IN URGENT: HCPCS | Performed by: PHYSICIAN ASSISTANT

## 2018-12-21 NOTE — PATIENT INSTRUCTIONS
Pharyngitis  Warm water gargles  Follow up with PCP in 3-5 days  Proceed to  ER if symptoms worsen  Pharyngitis   WHAT YOU NEED TO KNOW:   Pharyngitis, or sore throat, is inflammation of the tissues and structures in your pharynx (throat)  Pharyngitis is most often caused by bacteria  It may also be caused by a cold or flu virus  Other causes include smoking, allergies, or acid reflux  DISCHARGE INSTRUCTIONS:   Call 911 for any of the following:   · You have trouble breathing or swallowing because your throat is swollen or sore  Return to the emergency department if:   · You are drooling because it hurts too much to swallow  · Your fever is higher than 102? F (39?C) or lasts longer than 3 days  · You are confused  · You taste blood in your throat  Contact your healthcare provider if:   · Your throat pain gets worse  · You have a painful lump in your throat that does not go away after 5 days  · Your symptoms do not improve after 5 days  · You have questions or concerns about your condition or care  Medicines:  Viral pharyngitis will go away on its own without treatment  Your sore throat should start to feel better in 3 to 5 days for both viral and bacterial infections  You may need any of the following:  · Antibiotics  treat a bacterial infection  · NSAIDs , such as ibuprofen, help decrease swelling, pain, and fever  NSAIDs can cause stomach bleeding or kidney problems in certain people  If you take blood thinner medicine, always ask your healthcare provider if NSAIDs are safe for you  Always read the medicine label and follow directions  · Acetaminophen  decreases pain and fever  It is available without a doctor's order  Ask how much to take and how often to take it  Follow directions  Acetaminophen can cause liver damage if not taken correctly  · Take your medicine as directed  Contact your healthcare provider if you think your medicine is not helping or if you have side effects  Tell him or her if you are allergic to any medicine  Keep a list of the medicines, vitamins, and herbs you take  Include the amounts, and when and why you take them  Bring the list or the pill bottles to follow-up visits  Carry your medicine list with you in case of an emergency  Manage your symptoms:   · Gargle salt water  Mix ¼ teaspoon salt in an 8 ounce glass of warm water and gargle  This may help decrease swelling in your throat  · Drink liquids as directed  You may need to drink more liquids than usual  Liquids may help soothe your throat and prevent dehydration  Ask how much liquid to drink each day and which liquids are best for you  · Use a cool-steam humidifier  to help moisten the air in your room and calm your cough  · Soothe your throat  with cough drops, ice, soft foods, or popsicles  Prevent the spread of pharyngitis:  Cover your mouth and nose when you cough or sneeze  Do not share food or drinks  Wash your hands often  Use soap and water  If soap and water are unavailable, use an alcohol based hand   Follow up with your healthcare provider as directed:  Write down your questions so you remember to ask them during your visits  © 2017 [facility] Information is for End User's use only and may not be sold, redistributed or otherwise used for commercial purposes  All illustrations and images included in CareNotes® are the copyrighted property of [employer]  or [employer]  The above information is an  only  It is not intended as medical advice for individual conditions or treatments  Talk to your doctor, nurse or pharmacist before following any medical regimen to see if it is safe and effective for you

## 2018-12-21 NOTE — PROGRESS NOTES
[facility]        NAME: Yuliya Duenas is a 28 y o  male  : 1985    MRN: [id number]  DATE: 2018  TIME: 3:59 PM    Assessment and Plan   Acute pharyngitis, unspecified etiology [J02 9]  1  Acute pharyngitis, unspecified etiology  POCT rapid strepA    Throat culture   2  Acute viral pharyngitis           Patient Instructions     Pharyngitis  Warm water gargles  Follow up with PCP in 3-5 days  Proceed to  ER if symptoms worsen  Chief Complaint     Chief Complaint   Patient presents with    Sore Throat         History of Present Illness       27 y/o male presents c/o sore throat x 4 days  States his son is also sick  Denies chest pain, SOB        Review of Systems   Review of Systems   Constitutional: Negative  HENT: Positive for sore throat  Negative for congestion, dental problem, drooling, ear pain, postnasal drip, rhinorrhea, sinus pain, trouble swallowing and voice change  Eyes: Negative  Respiratory: Negative  Negative for apnea, cough, choking, chest tightness, shortness of breath, wheezing and stridor  Cardiovascular: Negative  Negative for chest pain  Current Medications     No current outpatient prescriptions on file  Current Allergies     Allergies as of 2018    (No Known Allergies)            The following portions of the patient's history were reviewed and updated as appropriate: allergies, current medications, past family history, past medical history, past social history, past surgical history and problem list      History reviewed  No pertinent past medical history  Past Surgical History:   Procedure Laterality Date    EAR SURGERY      VASECTOMY  2018       Family History   Problem Relation Age of Onset    Hypertension Father     No Known Problems Mother          Medications have been verified          Objective   /74   Pulse 57   Temp 98 °F (36 7 °C) (Temporal)   Resp 18   Ht 5' 9" (1 753 m)   Wt 79 kg (174 lb 3 2 oz)   SpO2 99%   BMI 25 72 kg/m²        Physical Exam     Physical Exam   Constitutional: He appears well-developed and well-nourished  No distress  HENT:   Head: Normocephalic and atraumatic  Right Ear: Hearing, tympanic membrane, external ear and ear canal normal    Left Ear: Hearing, tympanic membrane, external ear and ear canal normal    Mouth/Throat: Uvula is midline and mucous membranes are normal  Posterior oropharyngeal erythema present  No oropharyngeal exudate, posterior oropharyngeal edema or tonsillar abscesses  Neck: Normal range of motion  Neck supple  Cardiovascular: Normal rate, regular rhythm, normal heart sounds and intact distal pulses  Pulmonary/Chest: Effort normal and breath sounds normal    Lymphadenopathy:     He has cervical adenopathy  Skin: He is not diaphoretic  Culture positive for strep group C  Patient called and asymptomatic

## 2018-12-25 LAB — BACTERIA THROAT CULT: ABNORMAL

## 2019-11-05 ENCOUNTER — IMMUNIZATIONS (OUTPATIENT)
Dept: FAMILY MEDICINE CLINIC | Facility: CLINIC | Age: 34
End: 2019-11-05
Payer: COMMERCIAL

## 2019-11-05 DIAGNOSIS — Z23 ENCOUNTER FOR IMMUNIZATION: ICD-10-CM

## 2019-11-05 PROCEDURE — 90682 RIV4 VACC RECOMBINANT DNA IM: CPT

## 2019-11-05 PROCEDURE — 90471 IMMUNIZATION ADMIN: CPT

## 2020-03-14 ENCOUNTER — TRANSCRIBE ORDERS (OUTPATIENT)
Dept: ADMINISTRATIVE | Facility: HOSPITAL | Age: 35
End: 2020-03-14

## 2020-03-14 ENCOUNTER — APPOINTMENT (OUTPATIENT)
Dept: LAB | Facility: HOSPITAL | Age: 35
End: 2020-03-14

## 2020-03-14 DIAGNOSIS — Z00.8 HEALTH EXAMINATION IN POPULATION SURVEY: Primary | ICD-10-CM

## 2020-03-14 DIAGNOSIS — Z00.8 HEALTH EXAMINATION IN POPULATION SURVEY: ICD-10-CM

## 2020-03-14 LAB
CHOLEST SERPL-MCNC: 154 MG/DL (ref 50–200)
EST. AVERAGE GLUCOSE BLD GHB EST-MCNC: 108 MG/DL
HBA1C MFR BLD: 5.4 %
HDLC SERPL-MCNC: 42 MG/DL
LDLC SERPL CALC-MCNC: 92 MG/DL (ref 0–100)
NONHDLC SERPL-MCNC: 112 MG/DL
TRIGL SERPL-MCNC: 102 MG/DL

## 2020-03-14 PROCEDURE — 83036 HEMOGLOBIN GLYCOSYLATED A1C: CPT

## 2020-03-14 PROCEDURE — 80061 LIPID PANEL: CPT

## 2020-03-14 PROCEDURE — 36415 COLL VENOUS BLD VENIPUNCTURE: CPT

## 2020-11-16 ENCOUNTER — OFFICE VISIT (OUTPATIENT)
Dept: FAMILY MEDICINE CLINIC | Facility: CLINIC | Age: 35
End: 2020-11-16
Payer: COMMERCIAL

## 2020-11-16 VITALS
WEIGHT: 173 LBS | RESPIRATION RATE: 18 BRPM | DIASTOLIC BLOOD PRESSURE: 70 MMHG | BODY MASS INDEX: 24.77 KG/M2 | OXYGEN SATURATION: 98 % | HEIGHT: 70 IN | SYSTOLIC BLOOD PRESSURE: 118 MMHG | TEMPERATURE: 97.9 F | HEART RATE: 54 BPM

## 2020-11-16 DIAGNOSIS — Z00.00 ANNUAL PHYSICAL EXAM: ICD-10-CM

## 2020-11-16 DIAGNOSIS — Z23 NEED FOR IMMUNIZATION AGAINST INFLUENZA: Primary | ICD-10-CM

## 2020-11-16 PROCEDURE — 90471 IMMUNIZATION ADMIN: CPT | Performed by: FAMILY MEDICINE

## 2020-11-16 PROCEDURE — 90682 RIV4 VACC RECOMBINANT DNA IM: CPT | Performed by: FAMILY MEDICINE

## 2020-11-16 PROCEDURE — 99395 PREV VISIT EST AGE 18-39: CPT | Performed by: FAMILY MEDICINE

## 2020-11-25 ENCOUNTER — TELEMEDICINE (OUTPATIENT)
Dept: FAMILY MEDICINE CLINIC | Facility: CLINIC | Age: 35
End: 2020-11-25
Payer: COMMERCIAL

## 2020-11-25 DIAGNOSIS — J01.00 ACUTE NON-RECURRENT MAXILLARY SINUSITIS: Primary | ICD-10-CM

## 2020-11-25 DIAGNOSIS — H00.014 HORDEOLUM EXTERNUM OF LEFT UPPER EYELID: ICD-10-CM

## 2020-11-25 PROCEDURE — 99214 OFFICE O/P EST MOD 30 MIN: CPT | Performed by: FAMILY MEDICINE

## 2020-11-25 RX ORDER — CEPHALEXIN 500 MG/1
500 CAPSULE ORAL EVERY 8 HOURS SCHEDULED
Qty: 21 CAPSULE | Refills: 0 | Status: SHIPPED | OUTPATIENT
Start: 2020-11-25 | End: 2020-12-02

## 2021-02-06 NOTE — PROGRESS NOTES
Procedures      No Scalpel Vasectomy Procedure Note    Indications: 28 y o   y o  male desiring permanent sterilization    Pre-operative Diagnosis: Undesired fertility    Post-operative Diagnosis: Undesired fertility    Anesthesia: Lidocaine 2% without epinephrine      Procedure Details     The risks and benefits of the procedure were discussed at the pre-procedure consultation, and written, informed consent obtained  Premedicated with Valium 10 mgm po 60 minutes prior to procedure  The scrotum was palpated with both testes normal in size and position, no masses palpitated  The scrotum was cleansed with warm Betadine and draped in the usual sterile manner  A vasal sheath block was performed on both the left and right vas  After adequate anesthesia was established, a small perforation was made in the skin and the left vas was isolated with the ring forceps, dissected free and delivered through the skin perforation  The left vas was divided, approximately 1 5 cm portion removed, and each end of the vas was cauterized and suture ligated  The ends of the vas were replaced in the scrotum through the puncture site  The right vas was then isolated, divided, cauterized and ligated in a similar fashion  Midportions removed were sent to pathology to confirm  Any bleeding was controlled with electrocautery  The puncture site was dry when the procedure was completed  After discussion with the patient, the puncture site was sutured using single 5-0 chromic suture in figure 8 fashion  Specimen:   1  Right vas deferens  2  Left vas deferens    Condition: Stable    Complications: none    Plan:        He did very well with the procedure today  Postprocedural instructions were provided  He will follow-up in 2-3 weeks for postoperative assessment  At that time we will coordinate his postprocedural semen analyses at 6 weeks, and again it 8 weeks after the procedure   He was instructed to call my office 2 weeks after his second specimen is submitted to review the results by phone  He was instructed to continue his current methods of contraception until that time  good balance

## 2021-08-28 ENCOUNTER — APPOINTMENT (OUTPATIENT)
Dept: LAB | Facility: HOSPITAL | Age: 36
End: 2021-08-28

## 2021-08-28 DIAGNOSIS — Z00.8 HEALTH EXAMINATION IN POPULATION SURVEY: ICD-10-CM

## 2021-08-28 LAB
CHOLEST SERPL-MCNC: 141 MG/DL (ref 50–200)
EST. AVERAGE GLUCOSE BLD GHB EST-MCNC: 114 MG/DL
HBA1C MFR BLD: 5.6 %
HDLC SERPL-MCNC: 53 MG/DL
LDLC SERPL CALC-MCNC: 76 MG/DL (ref 0–100)
NONHDLC SERPL-MCNC: 88 MG/DL
TRIGL SERPL-MCNC: 59 MG/DL

## 2021-08-28 PROCEDURE — 83036 HEMOGLOBIN GLYCOSYLATED A1C: CPT

## 2021-08-28 PROCEDURE — 80061 LIPID PANEL: CPT

## 2021-08-28 PROCEDURE — 36415 COLL VENOUS BLD VENIPUNCTURE: CPT

## 2021-10-21 ENCOUNTER — CLINICAL SUPPORT (OUTPATIENT)
Dept: FAMILY MEDICINE CLINIC | Facility: CLINIC | Age: 36
End: 2021-10-21
Payer: COMMERCIAL

## 2021-10-21 DIAGNOSIS — Z23 NEED FOR IMMUNIZATION AGAINST INFLUENZA: Primary | ICD-10-CM

## 2021-10-21 PROCEDURE — 90682 RIV4 VACC RECOMBINANT DNA IM: CPT

## 2021-10-21 PROCEDURE — 90471 IMMUNIZATION ADMIN: CPT

## 2022-07-07 ENCOUNTER — OFFICE VISIT (OUTPATIENT)
Dept: FAMILY MEDICINE CLINIC | Facility: CLINIC | Age: 37
End: 2022-07-07
Payer: COMMERCIAL

## 2022-07-07 VITALS
SYSTOLIC BLOOD PRESSURE: 122 MMHG | DIASTOLIC BLOOD PRESSURE: 74 MMHG | WEIGHT: 169.8 LBS | TEMPERATURE: 97.7 F | OXYGEN SATURATION: 97 % | RESPIRATION RATE: 12 BRPM | BODY MASS INDEX: 25.15 KG/M2 | HEART RATE: 52 BPM | HEIGHT: 69 IN

## 2022-07-07 DIAGNOSIS — Z13.220 ENCOUNTER FOR SCREENING FOR LIPID DISORDER: ICD-10-CM

## 2022-07-07 DIAGNOSIS — Z00.00 ANNUAL PHYSICAL EXAM: Primary | ICD-10-CM

## 2022-07-07 PROCEDURE — 99395 PREV VISIT EST AGE 18-39: CPT | Performed by: FAMILY MEDICINE

## 2022-07-07 NOTE — PATIENT INSTRUCTIONS

## 2022-07-07 NOTE — PROGRESS NOTES
[address]    NAME: Anna Hinson  AGE: 39 y o  SEX: male  : 1985     DATE: 2022     Assessment and Plan:     Problem List Items Addressed This Visit    None         Immunizations and preventive care screenings were discussed with patient today  Appropriate education was printed on patient's after visit summary  Counseling:  Alcohol/drug use: discussed moderation in alcohol intake, the recommendations for healthy alcohol use, and avoidance of illicit drug use  Dental Health: discussed importance of regular tooth brushing, flossing, and dental visits  Injury prevention: discussed safety/seat belts, safety helmets, smoke detectors, carbon dioxide detectors, and smoking near bedding or upholstery  · Exercise: the importance of regular exercise/physical activity was discussed  Recommend exercise 3-5 times per week for at least 30 minutes  BMI Counseling: Body mass index is 25 08 kg/m²  The BMI is above normal  Nutrition recommendations include decreasing portion sizes, decreasing fast food intake, limiting drinks that contain sugar, moderation in carbohydrate intake, increasing intake of lean protein, reducing intake of saturated and trans fat and reducing intake of cholesterol  Exercise recommendations include moderate physical activity 150 minutes/week and exercising 3-5 times per week  No pharmacotherapy was ordered  Rationale for BMI follow-up plan is due to patient being overweight or obese  Depression Screening and Follow-up Plan: Patient was screened for depression during today's encounter  They screened negative with a PHQ-2 score of 0  No follow-ups on file       Chief Complaint:     Chief Complaint   Patient presents with    Physical Exam      History of Present Illness:     Adult Annual Physical   Patient here for a comprehensive physical exam  The patient reports no problems  [employer] , purchasing   Previous [employer] , degree in mapping ,   Past med   Labile htn , prior to this appt coffee approx 3 x  16 oz average per day   Diet and Physical Activity  · Diet/Nutrition: well balanced diet  · Exercise: moderate cardiovascular exercise  Depression Screening  PHQ-2/9 Depression Screening    Little interest or pleasure in doing things: 0 - not at all  Feeling down, depressed, or hopeless: 0 - not at all  PHQ-2 Score: 0  PHQ-2 Interpretation: Negative depression screen       General Health  · Sleep: sleeps well  · Vision: previous LASIK surgery  · Dental: regular dental visits  [facility]  · History of STDs?: no      Review of Systems:     Review of Systems   Constitutional: Negative for appetite change, chills, fever and unexpected weight change  HENT: Negative for congestion, dental problem, ear pain, hearing loss, postnasal drip, rhinorrhea, sinus pressure, sinus pain, sneezing, sore throat, tinnitus and voice change  Eyes: Negative for visual disturbance  Respiratory: Negative for apnea, cough, chest tightness and shortness of breath  Cardiovascular: Negative for chest pain, palpitations and leg swelling  Gastrointestinal: Negative for abdominal pain, blood in stool, constipation, diarrhea, nausea and vomiting  Endocrine: Negative for cold intolerance, heat intolerance, polydipsia, polyphagia and polyuria  Genitourinary: Negative for decreased urine volume, difficulty urinating, dysuria, frequency and hematuria  Musculoskeletal: Negative for arthralgias, back pain, gait problem, joint swelling and myalgias  Skin: Negative for color change, rash and wound  Allergic/Immunologic: Negative for environmental allergies and food allergies  Neurological: Negative for dizziness, syncope, weakness, light-headedness, numbness and headaches  Hematological: Negative for adenopathy  Does not bruise/bleed easily  Psychiatric/Behavioral: Negative for sleep disturbance and suicidal ideas  The patient is not nervous/anxious  Past Medical History:     History reviewed  No pertinent past medical history  Past Surgical History:     Past Surgical History:   Procedure Laterality Date    EAR SURGERY      VASECTOMY  07/19/2018      Social History:     Social History     Socioeconomic History    Marital status: /Civil Union     Spouse name: None    Number of children: None    Years of education: None    Highest education level: None   Occupational History    None   Tobacco Use    Smoking status: Never Smoker    Smokeless tobacco: Never Used   Substance and Sexual Activity    Alcohol use: Yes     Comment: occasionally    Drug use: No    Sexual activity: Yes     Partners: Female   Other Topics Concern    None   Social History Narrative    Always uses coleen belt    Daily caffeinated coffee consumption    Employed    Exercises regularly     Lives with spouse    Seeing a dentist     Social Determinants of Health     Financial Resource Strain: Not on file   Food Insecurity: Not on file   Transportation Needs: Not on file   Physical Activity: Not on file   Stress: Not on file   Social Connections: Not on file   Intimate Partner Violence: Not on file   Housing Stability: Not on file      Family History:     Family History   Problem Relation Age of Onset    Hypertension Father     No Known Problems Mother       Current Medications:     No current outpatient medications on file  No current facility-administered medications for this visit  Allergies:     No Known Allergies   Physical Exam:     /70   Pulse (!) 52   Temp 97 7 °F (36 5 °C)   Resp 12   Ht 5' 9" (1 753 m)   Wt 77 kg (169 lb 12 8 oz)   SpO2 97%   BMI 25 08 kg/m²     Physical Exam  Vitals and nursing note reviewed  Constitutional:       General: He is not in acute distress  Appearance: He is well-developed   He is not ill-appearing or toxic-appearing  HENT:      Head: Normocephalic and atraumatic  Eyes:      Conjunctiva/sclera: Conjunctivae normal    Neck:      Vascular: No carotid bruit  Cardiovascular:      Rate and Rhythm: Normal rate and regular rhythm  Heart sounds: No murmur heard  Pulmonary:      Effort: Pulmonary effort is normal  No respiratory distress  Breath sounds: Normal breath sounds  Abdominal:      Palpations: Abdomen is soft  Tenderness: There is no abdominal tenderness  Musculoskeletal:      Cervical back: Neck supple  Right lower leg: No edema  Left lower leg: No edema  Lymphadenopathy:      Cervical: No cervical adenopathy  Skin:     General: Skin is warm and dry  Neurological:      Mental Status: He is alert and oriented to person, place, and time  Psychiatric:         Mood and Affect: Mood normal          Behavior: Behavior normal          Thought Content:  Thought content normal          Judgment: Judgment normal           Hossein Diaz, [address] [facility] [address] [facility]

## 2022-07-09 ENCOUNTER — APPOINTMENT (OUTPATIENT)
Dept: LAB | Facility: HOSPITAL | Age: 37
End: 2022-07-09
Payer: COMMERCIAL

## 2022-07-09 DIAGNOSIS — Z00.00 ANNUAL PHYSICAL EXAM: ICD-10-CM

## 2022-07-09 DIAGNOSIS — Z13.220 ENCOUNTER FOR SCREENING FOR LIPID DISORDER: ICD-10-CM

## 2022-07-09 DIAGNOSIS — Z00.8 HEALTH EXAMINATION IN POPULATION SURVEY: ICD-10-CM

## 2022-07-09 LAB
ALBUMIN SERPL BCP-MCNC: 3.8 G/DL (ref 3.5–5)
ALP SERPL-CCNC: 42 U/L (ref 46–116)
ALT SERPL W P-5'-P-CCNC: 31 U/L (ref 12–78)
ANION GAP SERPL CALCULATED.3IONS-SCNC: 9 MMOL/L (ref 4–13)
AST SERPL W P-5'-P-CCNC: 25 U/L (ref 5–45)
BILIRUB SERPL-MCNC: 0.47 MG/DL (ref 0.2–1)
BUN SERPL-MCNC: 15 MG/DL (ref 5–25)
CALCIUM SERPL-MCNC: 9 MG/DL (ref 8.3–10.1)
CHLORIDE SERPL-SCNC: 102 MMOL/L (ref 100–108)
CHOLEST SERPL-MCNC: 157 MG/DL
CO2 SERPL-SCNC: 29 MMOL/L (ref 21–32)
CREAT SERPL-MCNC: 1.02 MG/DL (ref 0.6–1.3)
ERYTHROCYTE [DISTWIDTH] IN BLOOD BY AUTOMATED COUNT: 12.3 % (ref 11.6–15.1)
EST. AVERAGE GLUCOSE BLD GHB EST-MCNC: 111 MG/DL
GFR SERPL CREATININE-BSD FRML MDRD: 94 ML/MIN/1.73SQ M
GLUCOSE P FAST SERPL-MCNC: 97 MG/DL (ref 65–99)
HBA1C MFR BLD: 5.5 %
HCT VFR BLD AUTO: 45.8 % (ref 36.5–49.3)
HDLC SERPL-MCNC: 56 MG/DL
HGB BLD-MCNC: 15.2 G/DL (ref 12–17)
LDLC SERPL CALC-MCNC: 87 MG/DL (ref 0–100)
MCH RBC QN AUTO: 29.5 PG (ref 26.8–34.3)
MCHC RBC AUTO-ENTMCNC: 33.2 G/DL (ref 31.4–37.4)
MCV RBC AUTO: 89 FL (ref 82–98)
NONHDLC SERPL-MCNC: 101 MG/DL
PLATELET # BLD AUTO: 214 THOUSANDS/UL (ref 149–390)
PMV BLD AUTO: 9.4 FL (ref 8.9–12.7)
POTASSIUM SERPL-SCNC: 4.3 MMOL/L (ref 3.5–5.3)
PROT SERPL-MCNC: 7 G/DL (ref 6.4–8.2)
RBC # BLD AUTO: 5.16 MILLION/UL (ref 3.88–5.62)
SODIUM SERPL-SCNC: 140 MMOL/L (ref 136–145)
TRIGL SERPL-MCNC: 72 MG/DL
TSH SERPL DL<=0.05 MIU/L-ACNC: 1.53 UIU/ML (ref 0.45–4.5)
WBC # BLD AUTO: 4.18 THOUSAND/UL (ref 4.31–10.16)

## 2022-07-09 PROCEDURE — 36415 COLL VENOUS BLD VENIPUNCTURE: CPT

## 2022-07-09 PROCEDURE — 83036 HEMOGLOBIN GLYCOSYLATED A1C: CPT

## 2022-07-09 PROCEDURE — 85027 COMPLETE CBC AUTOMATED: CPT

## 2022-07-09 PROCEDURE — 84443 ASSAY THYROID STIM HORMONE: CPT

## 2022-07-09 PROCEDURE — 80053 COMPREHEN METABOLIC PANEL: CPT

## 2022-07-09 PROCEDURE — 80061 LIPID PANEL: CPT

## 2022-07-20 DIAGNOSIS — J01.00 ACUTE NON-RECURRENT MAXILLARY SINUSITIS: Primary | ICD-10-CM

## 2022-07-20 RX ORDER — AMOXICILLIN 500 MG/1
500 CAPSULE ORAL EVERY 8 HOURS SCHEDULED
Qty: 30 CAPSULE | Refills: 0 | Status: SHIPPED | OUTPATIENT
Start: 2022-07-20 | End: 2022-07-30

## 2022-11-03 ENCOUNTER — CLINICAL SUPPORT (OUTPATIENT)
Dept: FAMILY MEDICINE CLINIC | Facility: CLINIC | Age: 37
End: 2022-11-03

## 2022-11-03 DIAGNOSIS — Z23 NEED FOR INFLUENZA VACCINATION: Primary | ICD-10-CM

## 2022-11-03 DIAGNOSIS — Z23 NEED FOR COVID-19 VACCINE: ICD-10-CM

## 2023-02-10 DIAGNOSIS — J01.00 ACUTE NON-RECURRENT MAXILLARY SINUSITIS: Primary | ICD-10-CM

## 2023-02-10 RX ORDER — AMOXICILLIN AND CLAVULANATE POTASSIUM 875; 125 MG/1; MG/1
1 TABLET, FILM COATED ORAL EVERY 12 HOURS SCHEDULED
Qty: 14 TABLET | Refills: 0 | Status: SHIPPED | OUTPATIENT
Start: 2023-02-10 | End: 2023-02-17

## 2023-08-02 ENCOUNTER — OFFICE VISIT (OUTPATIENT)
Dept: FAMILY MEDICINE CLINIC | Facility: CLINIC | Age: 38
End: 2023-08-02
Payer: COMMERCIAL

## 2023-08-02 ENCOUNTER — APPOINTMENT (OUTPATIENT)
Dept: LAB | Facility: CLINIC | Age: 38
End: 2023-08-02

## 2023-08-02 VITALS
HEIGHT: 69 IN | WEIGHT: 166.6 LBS | DIASTOLIC BLOOD PRESSURE: 84 MMHG | HEART RATE: 51 BPM | SYSTOLIC BLOOD PRESSURE: 120 MMHG | OXYGEN SATURATION: 98 % | TEMPERATURE: 96.5 F | BODY MASS INDEX: 24.68 KG/M2

## 2023-08-02 DIAGNOSIS — Z00.00 ANNUAL PHYSICAL EXAM: Primary | ICD-10-CM

## 2023-08-02 DIAGNOSIS — Z00.8 HEALTH EXAMINATION IN POPULATION SURVEY: ICD-10-CM

## 2023-08-02 DIAGNOSIS — Z13.1 SCREENING FOR DIABETES MELLITUS: ICD-10-CM

## 2023-08-02 DIAGNOSIS — Z13.0 SCREENING FOR DEFICIENCY ANEMIA: ICD-10-CM

## 2023-08-02 DIAGNOSIS — Z13.29 SCREENING FOR THYROID DISORDER: ICD-10-CM

## 2023-08-02 LAB
CHOLEST SERPL-MCNC: 150 MG/DL
EST. AVERAGE GLUCOSE BLD GHB EST-MCNC: 126 MG/DL
HBA1C MFR BLD: 6 %
HDLC SERPL-MCNC: 49 MG/DL
LDLC SERPL CALC-MCNC: 80 MG/DL (ref 0–100)
NONHDLC SERPL-MCNC: 101 MG/DL
TRIGL SERPL-MCNC: 105 MG/DL

## 2023-08-02 PROCEDURE — 80061 LIPID PANEL: CPT

## 2023-08-02 PROCEDURE — 36415 COLL VENOUS BLD VENIPUNCTURE: CPT

## 2023-08-02 PROCEDURE — 83036 HEMOGLOBIN GLYCOSYLATED A1C: CPT

## 2023-08-02 PROCEDURE — 99395 PREV VISIT EST AGE 18-39: CPT | Performed by: NURSE PRACTITIONER

## 2023-08-02 NOTE — PROGRESS NOTES
[address]    NAME: Bogdan Winter  AGE: 40 y.o. SEX: male  : 1985     DATE: 2023     Assessment and Plan:     Problem List Items Addressed This Visit    None  Visit Diagnoses     Annual physical exam    -  Primary    Screening for deficiency anemia        Relevant Orders    CBC and differential    Screening for diabetes mellitus        Relevant Orders    Comprehensive metabolic panel    Screening for thyroid disorder        Relevant Orders    TSH, 3rd generation with Free T4 reflex          Immunizations and preventive care screenings were discussed with patient today. Appropriate education was printed on patient's after visit summary. Counseling:  Alcohol/drug use: discussed moderation in alcohol intake, the recommendations for healthy alcohol use, and avoidance of illicit drug use. Dental Health: discussed importance of regular tooth brushing, flossing, and dental visits. Injury prevention: discussed safety/seat belts, safety helmets, smoke detectors, carbon dioxide detectors, and smoking near bedding or upholstery. Sexual health: discussed sexually transmitted diseases, partner selection, use of condoms, avoidance of unintended pregnancy, and contraceptive alternatives. · Exercise: the importance of regular exercise/physical activity was discussed. Recommend exercise 3-5 times per week for at least 30 minutes. Depression Screening and Follow-up Plan: Patient was screened for depression during today's encounter. They screened negative with a PHQ-2 score of 0. Return in about 1 year (around 2024) for Annual physical.     Chief Complaint:     Chief Complaint   Patient presents with   • Physical Exam      History of Present Illness:     Adult Annual Physical   Patient here for a comprehensive physical exam. The patient reports no problems.     Diet and Physical Activity  · Diet/Nutrition: well balanced diet and consuming 3-5 servings of fruits/vegetables daily. · Exercise: moderate cardiovascular exercise, strength training exercises and 5-7 times a week on average. Depression Screening  PHQ-2/9 Depression Screening    Little interest or pleasure in doing things: 0 - not at all  Feeling down, depressed, or hopeless: 0 - not at all  PHQ-2 Score: 0  PHQ-2 Interpretation: Negative depression screen       General Health  · Sleep: gets 7-8 hours of sleep on average. · Hearing: normal - bilateral.  · Vision: previous LASIK surgery. · Dental: regular dental visits, brushes teeth twice daily and flosses teeth occasionally.  Health  · History of STDs?: no.     Review of Systems:     Review of Systems   Constitutional: Negative for activity change, appetite change, fatigue and unexpected weight change. HENT: Negative for congestion, ear pain, sore throat and trouble swallowing. Eyes: Negative for photophobia and visual disturbance. Respiratory: Negative for cough, chest tightness and shortness of breath. Cardiovascular: Negative for chest pain and palpitations. Gastrointestinal: Negative for abdominal pain, blood in stool, constipation, diarrhea and vomiting. Endocrine: Negative for polydipsia, polyphagia and polyuria. Genitourinary: Negative for decreased urine volume, dysuria, flank pain, frequency, hematuria and urgency. Musculoskeletal: Negative for arthralgias, back pain and myalgias. Skin: Negative for color change and rash. Neurological: Negative for dizziness, syncope, weakness, light-headedness and headaches. Hematological: Negative for adenopathy. Does not bruise/bleed easily. Psychiatric/Behavioral: Negative for confusion and dysphoric mood. The patient is not nervous/anxious.        Past Medical History:     Past Medical History:   Diagnosis Date   • Otitis media       Past Surgical History:     Past Surgical History: Procedure Laterality Date   • EAR SURGERY     • VASECTOMY  07/19/2018      Social History:     Social History     Socioeconomic History   • Marital status: /Civil Union     Spouse name: None   • Number of children: None   • Years of education: None   • Highest education level: None   Occupational History   • None   Tobacco Use   • Smoking status: Never     Passive exposure: Never   • Smokeless tobacco: Never   Vaping Use   • Vaping Use: Never used   Substance and Sexual Activity   • Alcohol use: Yes     Alcohol/week: 4.0 standard drinks of alcohol     Types: 4 Cans of beer per week     Comment: occasionally   • Drug use: No   • Sexual activity: Yes     Partners: Female   Other Topics Concern   • None   Social History Narrative    Always uses coleen belt    Daily caffeinated coffee consumption    Employed    Exercises regularly     Lives with spouse    Seeing a dentist     Social Determinants of Health     Financial Resource Strain: Not on file   Food Insecurity: Not on file   Transportation Needs: Not on file   Physical Activity: Not on file   Stress: Not on file   Social Connections: Not on file   Intimate Partner Violence: Not on file   Housing Stability: Not on file      Family History:     Family History   Problem Relation Age of Onset   • Hypertension Father    • No Known Problems Mother       Current Medications:     No current outpatient medications on file. No current facility-administered medications for this visit. Allergies:     No Known Allergies   Physical Exam:     /84 (BP Location: Left arm, Patient Position: Sitting, Cuff Size: Standard)   Pulse (!) 51   Temp (!) 96.5 °F (35.8 °C) (Tympanic)   Ht 5' 9" (1.753 m)   Wt 75.6 kg (166 lb 9.6 oz)   SpO2 98%   BMI 24.60 kg/m²     Physical Exam  Vitals reviewed. Constitutional:       General: He is not in acute distress. Appearance: Normal appearance. He is well-developed. He is not ill-appearing.    HENT:      Head: Normocephalic and atraumatic. Right Ear: Tympanic membrane, ear canal and external ear normal.      Left Ear: Tympanic membrane, ear canal and external ear normal.      Nose: Nose normal.      Mouth/Throat:      Mouth: Mucous membranes are moist.      Pharynx: Oropharynx is clear. Eyes:      Conjunctiva/sclera: Conjunctivae normal.      Pupils: Pupils are equal, round, and reactive to light. Cardiovascular:      Rate and Rhythm: Normal rate and regular rhythm. Pulses: Normal pulses. Heart sounds: Normal heart sounds. No murmur heard. Pulmonary:      Effort: Pulmonary effort is normal. No respiratory distress. Breath sounds: Normal breath sounds. Abdominal:      General: Bowel sounds are normal.      Palpations: Abdomen is soft. There is no mass. Tenderness: There is no abdominal tenderness. There is no right CVA tenderness or left CVA tenderness. Hernia: No hernia is present. Musculoskeletal:         General: No swelling. Normal range of motion. Cervical back: Normal range of motion and neck supple. Right lower leg: No edema. Left lower leg: No edema. Lymphadenopathy:      Cervical: No cervical adenopathy. Skin:     General: Skin is warm and dry. Capillary Refill: Capillary refill takes less than 2 seconds. Neurological:      General: No focal deficit present. Mental Status: He is alert and oriented to person, place, and time.    Psychiatric:         Mood and Affect: Mood normal.         Behavior: Behavior normal.          Kisha Bang, [id number] [facility] Unknown

## 2023-08-07 DIAGNOSIS — R73.03 PRE-DIABETES: Primary | ICD-10-CM

## 2023-08-12 ENCOUNTER — APPOINTMENT (OUTPATIENT)
Dept: LAB | Facility: HOSPITAL | Age: 38
End: 2023-08-12
Payer: COMMERCIAL

## 2023-08-12 DIAGNOSIS — Z13.0 SCREENING FOR DEFICIENCY ANEMIA: ICD-10-CM

## 2023-08-12 DIAGNOSIS — Z13.1 SCREENING FOR DIABETES MELLITUS: ICD-10-CM

## 2023-08-12 DIAGNOSIS — Z13.29 SCREENING FOR THYROID DISORDER: ICD-10-CM

## 2023-08-12 DIAGNOSIS — R73.03 PRE-DIABETES: ICD-10-CM

## 2023-08-12 LAB
ALBUMIN SERPL BCP-MCNC: 4.6 G/DL (ref 3.5–5)
ALP SERPL-CCNC: 38 U/L (ref 34–104)
ALT SERPL W P-5'-P-CCNC: 18 U/L (ref 7–52)
AMYLASE SERPL-CCNC: 46 IU/L (ref 29–103)
ANION GAP SERPL CALCULATED.3IONS-SCNC: 4 MMOL/L
AST SERPL W P-5'-P-CCNC: 20 U/L (ref 13–39)
BASOPHILS # BLD AUTO: 0.05 THOUSANDS/ÂΜL (ref 0–0.1)
BASOPHILS NFR BLD AUTO: 1 % (ref 0–1)
BILIRUB SERPL-MCNC: 0.69 MG/DL (ref 0.2–1)
BUN SERPL-MCNC: 17 MG/DL (ref 5–25)
CALCIUM SERPL-MCNC: 10 MG/DL (ref 8.4–10.2)
CHLORIDE SERPL-SCNC: 104 MMOL/L (ref 96–108)
CO2 SERPL-SCNC: 31 MMOL/L (ref 21–32)
CREAT SERPL-MCNC: 1.03 MG/DL (ref 0.6–1.3)
EOSINOPHIL # BLD AUTO: 0.04 THOUSAND/ÂΜL (ref 0–0.61)
EOSINOPHIL NFR BLD AUTO: 1 % (ref 0–6)
ERYTHROCYTE [DISTWIDTH] IN BLOOD BY AUTOMATED COUNT: 11.9 % (ref 11.6–15.1)
GFR SERPL CREATININE-BSD FRML MDRD: 92 ML/MIN/1.73SQ M
GLUCOSE P FAST SERPL-MCNC: 98 MG/DL (ref 65–99)
HCT VFR BLD AUTO: 46.8 % (ref 36.5–49.3)
HGB BLD-MCNC: 15.4 G/DL (ref 12–17)
IMM GRANULOCYTES # BLD AUTO: 0.01 THOUSAND/UL (ref 0–0.2)
IMM GRANULOCYTES NFR BLD AUTO: 0 % (ref 0–2)
INSULIN SERPL-ACNC: 2.24 UIU/ML (ref 1.9–23)
LIPASE SERPL-CCNC: 20 U/L (ref 11–82)
LYMPHOCYTES # BLD AUTO: 1.89 THOUSANDS/ÂΜL (ref 0.6–4.47)
LYMPHOCYTES NFR BLD AUTO: 42 % (ref 14–44)
MCH RBC QN AUTO: 29.2 PG (ref 26.8–34.3)
MCHC RBC AUTO-ENTMCNC: 32.9 G/DL (ref 31.4–37.4)
MCV RBC AUTO: 89 FL (ref 82–98)
MONOCYTES # BLD AUTO: 0.36 THOUSAND/ÂΜL (ref 0.17–1.22)
MONOCYTES NFR BLD AUTO: 8 % (ref 4–12)
NEUTROPHILS # BLD AUTO: 2.11 THOUSANDS/ÂΜL (ref 1.85–7.62)
NEUTS SEG NFR BLD AUTO: 48 % (ref 43–75)
NRBC BLD AUTO-RTO: 0 /100 WBCS
PLATELET # BLD AUTO: 225 THOUSANDS/UL (ref 149–390)
PMV BLD AUTO: 9.3 FL (ref 8.9–12.7)
POTASSIUM SERPL-SCNC: 4.6 MMOL/L (ref 3.5–5.3)
PROT SERPL-MCNC: 7.2 G/DL (ref 6.4–8.4)
RBC # BLD AUTO: 5.28 MILLION/UL (ref 3.88–5.62)
SODIUM SERPL-SCNC: 139 MMOL/L (ref 135–147)
TSH SERPL DL<=0.05 MIU/L-ACNC: 1.31 UIU/ML (ref 0.45–4.5)
WBC # BLD AUTO: 4.46 THOUSAND/UL (ref 4.31–10.16)

## 2023-08-12 PROCEDURE — 84443 ASSAY THYROID STIM HORMONE: CPT

## 2023-08-12 PROCEDURE — 36415 COLL VENOUS BLD VENIPUNCTURE: CPT

## 2023-08-12 PROCEDURE — 83525 ASSAY OF INSULIN: CPT

## 2023-08-12 PROCEDURE — 82150 ASSAY OF AMYLASE: CPT

## 2023-08-12 PROCEDURE — 83690 ASSAY OF LIPASE: CPT

## 2023-08-12 PROCEDURE — 80053 COMPREHEN METABOLIC PANEL: CPT

## 2023-08-12 PROCEDURE — 85025 COMPLETE CBC W/AUTO DIFF WBC: CPT

## 2023-10-09 ENCOUNTER — IMMUNIZATIONS (OUTPATIENT)
Dept: FAMILY MEDICINE CLINIC | Facility: CLINIC | Age: 38
End: 2023-10-09
Payer: COMMERCIAL

## 2023-10-09 DIAGNOSIS — Z23 NEED FOR INFLUENZA VACCINATION: Primary | ICD-10-CM

## 2023-10-09 PROCEDURE — 90471 IMMUNIZATION ADMIN: CPT

## 2023-10-09 PROCEDURE — 90686 IIV4 VACC NO PRSV 0.5 ML IM: CPT

## 2023-11-16 DIAGNOSIS — J02.0 STREP PHARYNGITIS: Primary | ICD-10-CM

## 2023-11-16 RX ORDER — AMOXICILLIN 500 MG/1
500 CAPSULE ORAL EVERY 8 HOURS SCHEDULED
Qty: 30 CAPSULE | Refills: 0 | Status: SHIPPED | OUTPATIENT
Start: 2023-11-16 | End: 2023-11-26

## 2024-04-02 DIAGNOSIS — J02.0 STREP PHARYNGITIS: Primary | ICD-10-CM

## 2024-04-02 RX ORDER — AMOXICILLIN AND CLAVULANATE POTASSIUM 875; 125 MG/1; MG/1
1 TABLET, FILM COATED ORAL EVERY 12 HOURS SCHEDULED
Qty: 20 TABLET | Refills: 0 | Status: SHIPPED | OUTPATIENT
Start: 2024-04-02 | End: 2024-04-12

## 2024-08-01 DIAGNOSIS — Z13.1 SCREENING FOR DIABETES MELLITUS: ICD-10-CM

## 2024-08-01 DIAGNOSIS — Z00.00 WELL ADULT ON ROUTINE HEALTH CHECK: ICD-10-CM

## 2024-08-01 DIAGNOSIS — Z13.220 ENCOUNTER FOR SCREENING FOR LIPID DISORDER: Primary | ICD-10-CM

## 2024-08-02 ENCOUNTER — APPOINTMENT (OUTPATIENT)
Dept: LAB | Facility: HOSPITAL | Age: 39
End: 2024-08-02
Payer: COMMERCIAL

## 2024-08-02 DIAGNOSIS — Z13.220 ENCOUNTER FOR SCREENING FOR LIPID DISORDER: ICD-10-CM

## 2024-08-02 DIAGNOSIS — Z00.00 WELL ADULT ON ROUTINE HEALTH CHECK: ICD-10-CM

## 2024-08-02 DIAGNOSIS — Z13.1 SCREENING FOR DIABETES MELLITUS: ICD-10-CM

## 2024-08-02 LAB
25(OH)D3 SERPL-MCNC: 61.4 NG/ML (ref 30–100)
ALBUMIN SERPL BCG-MCNC: 4.3 G/DL (ref 3.5–5)
ALP SERPL-CCNC: 38 U/L (ref 34–104)
ALT SERPL W P-5'-P-CCNC: 19 U/L (ref 7–52)
ANION GAP SERPL CALCULATED.3IONS-SCNC: 6 MMOL/L (ref 4–13)
AST SERPL W P-5'-P-CCNC: 24 U/L (ref 13–39)
BASOPHILS # BLD AUTO: 0.06 THOUSANDS/ÂΜL (ref 0–0.1)
BASOPHILS NFR BLD AUTO: 1 % (ref 0–1)
BILIRUB SERPL-MCNC: 0.57 MG/DL (ref 0.2–1)
BUN SERPL-MCNC: 14 MG/DL (ref 5–25)
CALCIUM SERPL-MCNC: 9.4 MG/DL (ref 8.4–10.2)
CHLORIDE SERPL-SCNC: 103 MMOL/L (ref 96–108)
CHOLEST SERPL-MCNC: 136 MG/DL
CO2 SERPL-SCNC: 29 MMOL/L (ref 21–32)
CREAT SERPL-MCNC: 0.98 MG/DL (ref 0.6–1.3)
EOSINOPHIL # BLD AUTO: 0.03 THOUSAND/ÂΜL (ref 0–0.61)
EOSINOPHIL NFR BLD AUTO: 1 % (ref 0–6)
ERYTHROCYTE [DISTWIDTH] IN BLOOD BY AUTOMATED COUNT: 11.9 % (ref 11.6–15.1)
EST. AVERAGE GLUCOSE BLD GHB EST-MCNC: 117 MG/DL
GFR SERPL CREATININE-BSD FRML MDRD: 97 ML/MIN/1.73SQ M
GLUCOSE P FAST SERPL-MCNC: 99 MG/DL (ref 65–99)
HBA1C MFR BLD: 5.7 %
HCT VFR BLD AUTO: 46.1 % (ref 36.5–49.3)
HDLC SERPL-MCNC: 51 MG/DL
HGB BLD-MCNC: 15.3 G/DL (ref 12–17)
IMM GRANULOCYTES # BLD AUTO: 0.01 THOUSAND/UL (ref 0–0.2)
IMM GRANULOCYTES NFR BLD AUTO: 0 % (ref 0–2)
LDLC SERPL CALC-MCNC: 75 MG/DL (ref 0–100)
LYMPHOCYTES # BLD AUTO: 1.84 THOUSANDS/ÂΜL (ref 0.6–4.47)
LYMPHOCYTES NFR BLD AUTO: 44 % (ref 14–44)
MCH RBC QN AUTO: 29.5 PG (ref 26.8–34.3)
MCHC RBC AUTO-ENTMCNC: 33.2 G/DL (ref 31.4–37.4)
MCV RBC AUTO: 89 FL (ref 82–98)
MONOCYTES # BLD AUTO: 0.53 THOUSAND/ÂΜL (ref 0.17–1.22)
MONOCYTES NFR BLD AUTO: 12 % (ref 4–12)
NEUTROPHILS # BLD AUTO: 1.82 THOUSANDS/ÂΜL (ref 1.85–7.62)
NEUTS SEG NFR BLD AUTO: 42 % (ref 43–75)
NRBC BLD AUTO-RTO: 0 /100 WBCS
PLATELET # BLD AUTO: 203 THOUSANDS/UL (ref 149–390)
PMV BLD AUTO: 9.3 FL (ref 8.9–12.7)
POTASSIUM SERPL-SCNC: 4.4 MMOL/L (ref 3.5–5.3)
PROT SERPL-MCNC: 7 G/DL (ref 6.4–8.4)
RBC # BLD AUTO: 5.19 MILLION/UL (ref 3.88–5.62)
SODIUM SERPL-SCNC: 138 MMOL/L (ref 135–147)
TRIGL SERPL-MCNC: 48 MG/DL
TSH SERPL DL<=0.05 MIU/L-ACNC: 1.15 UIU/ML (ref 0.45–4.5)
WBC # BLD AUTO: 4.29 THOUSAND/UL (ref 4.31–10.16)

## 2024-08-02 PROCEDURE — 80061 LIPID PANEL: CPT

## 2024-08-02 PROCEDURE — 36415 COLL VENOUS BLD VENIPUNCTURE: CPT

## 2024-08-02 PROCEDURE — 85025 COMPLETE CBC W/AUTO DIFF WBC: CPT

## 2024-08-02 PROCEDURE — 82306 VITAMIN D 25 HYDROXY: CPT

## 2024-08-02 PROCEDURE — 84443 ASSAY THYROID STIM HORMONE: CPT

## 2024-08-02 PROCEDURE — 83036 HEMOGLOBIN GLYCOSYLATED A1C: CPT

## 2024-08-02 PROCEDURE — 80053 COMPREHEN METABOLIC PANEL: CPT

## 2024-08-06 ENCOUNTER — OFFICE VISIT (OUTPATIENT)
Dept: FAMILY MEDICINE CLINIC | Facility: CLINIC | Age: 39
End: 2024-08-06
Payer: COMMERCIAL

## 2024-08-06 VITALS
OXYGEN SATURATION: 98 % | DIASTOLIC BLOOD PRESSURE: 76 MMHG | HEIGHT: 69 IN | WEIGHT: 164.4 LBS | HEART RATE: 78 BPM | RESPIRATION RATE: 18 BRPM | BODY MASS INDEX: 24.35 KG/M2 | SYSTOLIC BLOOD PRESSURE: 122 MMHG

## 2024-08-06 DIAGNOSIS — Z00.00 ANNUAL PHYSICAL EXAM: Primary | ICD-10-CM

## 2024-08-06 PROCEDURE — 99395 PREV VISIT EST AGE 18-39: CPT | Performed by: FAMILY MEDICINE

## 2024-08-06 NOTE — PROGRESS NOTES
Adult Annual Physical  Name: Sim Colby      : 1985      MRN: [id number]  Encounter Provider: SIS Orozco  Encounter Date: 2024   Encounter department: [facility] [address] [place] [address]    Assessment & Plan   1. Annual physical exam    Immunizations and preventive care screenings were discussed with patient today. Appropriate education was printed on patient's after visit summary.    Counseling:  Alcohol/drug use: discussed moderation in alcohol intake, the recommendations for healthy alcohol use, and avoidance of illicit drug use.  Dental Health: discussed importance of regular tooth brushing, flossing, and dental visits.  Injury prevention: discussed safety/seat belts, safety helmets, smoke detectors, carbon dioxide detectors, and smoking near bedding or upholstery.  Exercise: the importance of regular exercise/physical activity was discussed. Recommend exercise 3-5 times per week for at least 30 minutes.       Depression Screening and Follow-up Plan: Patient was screened for depression during today's encounter. They screened negative with a PHQ-2 score of 0.        History of Present Illness     Adult Annual Physical:  Patient presents for annual physical.     Diet and Physical Activity:  - Diet/Nutrition: well balanced diet, adequate whole grain intake and low carb diet.  - Exercise: 3-4 times a week on average.    Depression Screening:  - PHQ-2 Score: 0    General Health:  - Sleep: sleeps well.  - Hearing: normal hearing bilateral ears.  - Vision: no vision problems and previous LASIK surgery.  - Dental: regular dental visits.    /GYN Health:    - History of STDs: no     Health:  - History of STDs: no.     Review of Systems   Constitutional:  Negative for appetite change, chills, fever and unexpected weight change.   HENT:  Negative for congestion, dental problem, ear pain, hearing loss, postnasal drip, rhinorrhea, sinus pressure, sinus pain, sneezing, sore  "throat, tinnitus and voice change.    Eyes:  Negative for visual disturbance.   Respiratory:  Negative for apnea, cough, chest tightness and shortness of breath.    Cardiovascular:  Negative for chest pain, palpitations and leg swelling.   Gastrointestinal:  Negative for abdominal pain, blood in stool, constipation, diarrhea, nausea and vomiting.   Endocrine: Negative for cold intolerance, heat intolerance, polydipsia, polyphagia and polyuria.   Genitourinary:  Negative for decreased urine volume, difficulty urinating, dysuria, frequency and hematuria.   Musculoskeletal:  Negative for arthralgias, back pain, gait problem, joint swelling and myalgias.   Skin:  Negative for color change, rash and wound.   Allergic/Immunologic: Negative for environmental allergies and food allergies.   Neurological:  Negative for dizziness, syncope, weakness, light-headedness, numbness and headaches.   Hematological:  Negative for adenopathy. Does not bruise/bleed easily.   Psychiatric/Behavioral:  Negative for sleep disturbance and suicidal ideas. The patient is not nervous/anxious.          Objective     /76 (BP Location: Left arm, Patient Position: Sitting)   Pulse 78   Resp 18   Ht 5' 9\" (1.753 m)   Wt 74.6 kg (164 lb 6.4 oz)   SpO2 98%   BMI 24.28 kg/m²     Physical Exam  Constitutional:       General: He is not in acute distress.     Appearance: He is well-developed. He is not ill-appearing.   HENT:      Head: Normocephalic and atraumatic.      Right Ear: Tympanic membrane normal.      Left Ear: Tympanic membrane normal.      Nose: Nose normal.   Eyes:      General: No scleral icterus.     Conjunctiva/sclera: Conjunctivae normal.   Neck:      Vascular: No carotid bruit.   Cardiovascular:      Rate and Rhythm: Normal rate and regular rhythm.      Heart sounds: Normal heart sounds.   Pulmonary:      Effort: Pulmonary effort is normal.      Breath sounds: Normal breath sounds.   Abdominal:      General: Bowel sounds are " normal.      Palpations: Abdomen is soft.   Musculoskeletal:         General: Normal range of motion.      Cervical back: Normal range of motion and neck supple.   Lymphadenopathy:      Cervical: No cervical adenopathy.   Skin:     General: Skin is warm and dry.      Findings: No lesion or rash.   Neurological:      Mental Status: He is alert and oriented to person, place, and time.      Deep Tendon Reflexes: Reflexes are normal and symmetric.   Psychiatric:         Behavior: Behavior normal.         Thought Content: Thought content normal.         Judgment: Judgment normal.

## 2024-10-07 ENCOUNTER — IMMUNIZATIONS (OUTPATIENT)
Dept: FAMILY MEDICINE CLINIC | Facility: CLINIC | Age: 39
End: 2024-10-07
Payer: COMMERCIAL

## 2024-10-07 DIAGNOSIS — Z23 NEED FOR IMMUNIZATION AGAINST INFLUENZA: Primary | ICD-10-CM

## 2024-10-07 PROCEDURE — 90656 IIV3 VACC NO PRSV 0.5 ML IM: CPT

## 2024-10-07 PROCEDURE — 90471 IMMUNIZATION ADMIN: CPT

## 2024-10-07 NOTE — PROGRESS NOTES
Pt presents to the office today for his seasonal flu shot. Flu vaccine administered in pt's right deltoid without incident. Pt tolerated this administration well.

## 2025-02-25 DIAGNOSIS — R21 RASH: Primary | ICD-10-CM

## 2025-02-25 RX ORDER — TRIAMCINOLONE ACETONIDE 5 MG/G
CREAM TOPICAL 3 TIMES DAILY PRN
Qty: 30 G | Refills: 1 | Status: SHIPPED | OUTPATIENT
Start: 2025-02-25

## 2025-06-13 ENCOUNTER — CLINICAL SUPPORT (OUTPATIENT)
Dept: FAMILY MEDICINE CLINIC | Facility: CLINIC | Age: 40
End: 2025-06-13

## 2025-06-13 VITALS
HEIGHT: 69 IN | BODY MASS INDEX: 24.29 KG/M2 | SYSTOLIC BLOOD PRESSURE: 112 MMHG | OXYGEN SATURATION: 99 % | DIASTOLIC BLOOD PRESSURE: 70 MMHG | HEART RATE: 72 BPM | WEIGHT: 164 LBS

## 2025-06-13 DIAGNOSIS — Z01.30 BLOOD PRESSURE CHECK: Primary | ICD-10-CM

## 2025-06-13 PROCEDURE — NURSE

## 2025-06-20 ENCOUNTER — APPOINTMENT (OUTPATIENT)
Dept: LAB | Facility: CLINIC | Age: 40
End: 2025-06-20
Payer: COMMERCIAL

## 2025-06-20 DIAGNOSIS — Z00.00 HEALTHCARE MAINTENANCE: ICD-10-CM

## 2025-06-20 DIAGNOSIS — Z00.00 HEALTHCARE MAINTENANCE: Primary | ICD-10-CM

## 2025-06-20 LAB
ALBUMIN SERPL BCG-MCNC: 4.4 G/DL (ref 3.5–5)
ALP SERPL-CCNC: 54 U/L (ref 34–104)
ALT SERPL W P-5'-P-CCNC: 34 U/L (ref 7–52)
ANION GAP SERPL CALCULATED.3IONS-SCNC: 4 MMOL/L (ref 4–13)
AST SERPL W P-5'-P-CCNC: 27 U/L (ref 13–39)
BASOPHILS # BLD AUTO: 0.06 THOUSANDS/ÂΜL (ref 0–0.1)
BASOPHILS NFR BLD AUTO: 1 % (ref 0–1)
BILIRUB SERPL-MCNC: 0.45 MG/DL (ref 0.2–1)
BUN SERPL-MCNC: 16 MG/DL (ref 5–25)
CALCIUM SERPL-MCNC: 9.1 MG/DL (ref 8.4–10.2)
CHLORIDE SERPL-SCNC: 103 MMOL/L (ref 96–108)
CO2 SERPL-SCNC: 32 MMOL/L (ref 21–32)
CREAT SERPL-MCNC: 0.98 MG/DL (ref 0.6–1.3)
EOSINOPHIL # BLD AUTO: 0.03 THOUSAND/ÂΜL (ref 0–0.61)
EOSINOPHIL NFR BLD AUTO: 1 % (ref 0–6)
ERYTHROCYTE [DISTWIDTH] IN BLOOD BY AUTOMATED COUNT: 12 % (ref 11.6–15.1)
GFR SERPL CREATININE-BSD FRML MDRD: 96 ML/MIN/1.73SQ M
GLUCOSE P FAST SERPL-MCNC: 98 MG/DL (ref 65–99)
HCT VFR BLD AUTO: 45.7 % (ref 36.5–49.3)
HGB BLD-MCNC: 15.1 G/DL (ref 12–17)
IMM GRANULOCYTES # BLD AUTO: 0.01 THOUSAND/UL (ref 0–0.2)
IMM GRANULOCYTES NFR BLD AUTO: 0 % (ref 0–2)
LYMPHOCYTES # BLD AUTO: 1.94 THOUSANDS/ÂΜL (ref 0.6–4.47)
LYMPHOCYTES NFR BLD AUTO: 41 % (ref 14–44)
MCH RBC QN AUTO: 29.4 PG (ref 26.8–34.3)
MCHC RBC AUTO-ENTMCNC: 33 G/DL (ref 31.4–37.4)
MCV RBC AUTO: 89 FL (ref 82–98)
MONOCYTES # BLD AUTO: 0.42 THOUSAND/ÂΜL (ref 0.17–1.22)
MONOCYTES NFR BLD AUTO: 9 % (ref 4–12)
NEUTROPHILS # BLD AUTO: 2.24 THOUSANDS/ÂΜL (ref 1.85–7.62)
NEUTS SEG NFR BLD AUTO: 48 % (ref 43–75)
NRBC BLD AUTO-RTO: 0 /100 WBCS
PLATELET # BLD AUTO: 234 THOUSANDS/UL (ref 149–390)
PMV BLD AUTO: 9.6 FL (ref 8.9–12.7)
POTASSIUM SERPL-SCNC: 4.2 MMOL/L (ref 3.5–5.3)
PROT SERPL-MCNC: 6.7 G/DL (ref 6.4–8.4)
RBC # BLD AUTO: 5.13 MILLION/UL (ref 3.88–5.62)
SODIUM SERPL-SCNC: 139 MMOL/L (ref 135–147)
TSH SERPL DL<=0.05 MIU/L-ACNC: 1.24 UIU/ML (ref 0.45–4.5)
WBC # BLD AUTO: 4.7 THOUSAND/UL (ref 4.31–10.16)

## 2025-06-20 PROCEDURE — 36415 COLL VENOUS BLD VENIPUNCTURE: CPT

## 2025-06-20 PROCEDURE — 80053 COMPREHEN METABOLIC PANEL: CPT

## 2025-06-20 PROCEDURE — 84443 ASSAY THYROID STIM HORMONE: CPT

## 2025-06-20 PROCEDURE — 85025 COMPLETE CBC W/AUTO DIFF WBC: CPT

## 2025-06-24 ENCOUNTER — APPOINTMENT (OUTPATIENT)
Dept: LAB | Facility: HOSPITAL | Age: 40
End: 2025-06-24
Attending: PREVENTIVE MEDICINE

## 2025-06-24 DIAGNOSIS — Z00.8 HEALTH EXAMINATION IN POPULATION SURVEY: ICD-10-CM

## 2025-06-24 LAB
CHOLEST SERPL-MCNC: 136 MG/DL (ref ?–200)
EST. AVERAGE GLUCOSE BLD GHB EST-MCNC: 111 MG/DL
HBA1C MFR BLD: 5.5 %
HDLC SERPL-MCNC: 43 MG/DL
LDLC SERPL CALC-MCNC: 81 MG/DL (ref 0–100)
NONHDLC SERPL-MCNC: 93 MG/DL
TRIGL SERPL-MCNC: 58 MG/DL (ref ?–150)

## 2025-06-24 PROCEDURE — 36415 COLL VENOUS BLD VENIPUNCTURE: CPT

## 2025-06-24 PROCEDURE — 80061 LIPID PANEL: CPT

## 2025-06-24 PROCEDURE — 83036 HEMOGLOBIN GLYCOSYLATED A1C: CPT
